# Patient Record
Sex: FEMALE | Race: WHITE | NOT HISPANIC OR LATINO | Employment: UNEMPLOYED | URBAN - METROPOLITAN AREA
[De-identification: names, ages, dates, MRNs, and addresses within clinical notes are randomized per-mention and may not be internally consistent; named-entity substitution may affect disease eponyms.]

---

## 2018-07-20 ENCOUNTER — OFFICE VISIT (OUTPATIENT)
Dept: GYNECOLOGY | Facility: CLINIC | Age: 43
End: 2018-07-20
Payer: COMMERCIAL

## 2018-07-20 VITALS
WEIGHT: 158 LBS | HEIGHT: 64 IN | DIASTOLIC BLOOD PRESSURE: 74 MMHG | SYSTOLIC BLOOD PRESSURE: 118 MMHG | BODY MASS INDEX: 26.98 KG/M2

## 2018-07-20 DIAGNOSIS — N76.2 ACUTE VULVITIS: ICD-10-CM

## 2018-07-20 DIAGNOSIS — R39.15 URINARY URGENCY: ICD-10-CM

## 2018-07-20 DIAGNOSIS — R35.0 URINARY FREQUENCY: ICD-10-CM

## 2018-07-20 DIAGNOSIS — Z12.31 SCREENING MAMMOGRAM, ENCOUNTER FOR: ICD-10-CM

## 2018-07-20 DIAGNOSIS — Z01.419 ENCOUNTER FOR GYNECOLOGICAL EXAMINATION WITHOUT ABNORMAL FINDING: Primary | ICD-10-CM

## 2018-07-20 DIAGNOSIS — N94.2 VAGINISMUS: ICD-10-CM

## 2018-07-20 DIAGNOSIS — Q51.4 UNICORNUATE UTERUS: ICD-10-CM

## 2018-07-20 DIAGNOSIS — N94.810 VULVAR VESTIBULITIS: ICD-10-CM

## 2018-07-20 DIAGNOSIS — N94.10 DYSPAREUNIA IN FEMALE: ICD-10-CM

## 2018-07-20 LAB
BACTERIA URNS QL MICRO: ABNORMAL /UL
BILIRUB UR QL STRIP.AUTO: NEGATIVE MG/DL
CLARITY UR REFRACT.AUTO: CLEAR
COLOR UR AUTO: YELLOW
GLUCOSE UR STRIP.AUTO-MCNC: NEGATIVE MG/DL
HGB UR QL STRIP.AUTO: NEGATIVE
HYALINE CASTS #/AREA URNS LPF: ABNORMAL /LPF
KETONES UR STRIP.AUTO-MCNC: NEGATIVE MG/DL
LEUKOCYTE ESTERASE UR QL STRIP.AUTO: NEGATIVE
NITRITE UR QL STRIP.AUTO: NEGATIVE
PH UR STRIP.AUTO: 7 [PH]
PROT UR QL STRIP.AUTO: NEGATIVE
RBC #/AREA URNS HPF: ABNORMAL /HPF
SP GR UR REFRACT.AUTO: 1.01
SQUAMOUS URNS QL MICRO: 1 /HPF
UROBILINOGEN UR STRIP-ACNC: 0.2 EU/DL
WBC #/AREA URNS HPF: ABNORMAL /HPF

## 2018-07-20 PROCEDURE — G0145 SCR C/V CYTO,THINLAYER,RESCR: HCPCS | Performed by: OBSTETRICS & GYNECOLOGY

## 2018-07-20 PROCEDURE — 81001 URINALYSIS AUTO W/SCOPE: CPT | Performed by: OBSTETRICS & GYNECOLOGY

## 2018-07-20 PROCEDURE — 99213 OFFICE O/P EST LOW 20 MIN: CPT | Mod: 25 | Performed by: OBSTETRICS & GYNECOLOGY

## 2018-07-20 PROCEDURE — 87624 HPV HI-RISK TYP POOLED RSLT: CPT | Performed by: OBSTETRICS & GYNECOLOGY

## 2018-07-20 PROCEDURE — S0610 ANNUAL GYNECOLOGICAL EXAMINA: HCPCS | Performed by: OBSTETRICS & GYNECOLOGY

## 2018-07-20 PROCEDURE — 87086 URINE CULTURE/COLONY COUNT: CPT | Performed by: OBSTETRICS & GYNECOLOGY

## 2018-07-20 RX ORDER — TRIAMCINOLONE ACETONIDE 0.25 MG/G
OINTMENT TOPICAL
Qty: 30 G | Refills: 1 | Status: SHIPPED | OUTPATIENT
Start: 2018-07-20 | End: 2019-08-29

## 2018-07-20 RX ORDER — ALBUTEROL SULFATE 90 UG/1
2 AEROSOL, METERED RESPIRATORY (INHALATION) AS NEEDED
Refills: 0 | COMMUNITY
Start: 2018-04-16 | End: 2022-05-28 | Stop reason: SDUPTHER

## 2018-07-20 NOTE — PROGRESS NOTES
"Chief Complaint:  Annual  Last exam . No complaints. Has unicornuate uterus, had infertility and pregnancy loss.  Kidneys evaluated, has 2. Has always had dyspareunia, at entry, \" sharp\", gets better after entry. \" Hitting something.\"Vag dryness, uses lubricant prn. No libido issues. One male partner.No hx of sexual abuse.  Due for mammo.     HPI:  2018  Aidee Damico is a 42 y.o. female who presents for annual exam.   Periods are regular every 26 days, lasting 8 days. Heavy for a few days then \"lingers\" Dysmenorrhea:none.     The patient is sexually active. GYN screening history: last pap: was normal and per pt. Domestic violence: no.     Current contraception: tubal ligation  History of abnormal Pap smear: no  Family history of uterine or ovarian cancer: no  History of abnormal mammogram: no  Family history of breast cancer: no    OB History: Menstrual History:  OB History      Para Term  AB Living    6 2 1 1 4 2    SAB TAB Ectopic Multiple Live Births    4 0 0 0 2         Menarche age: 11  Patient's last menstrual period was 07/15/2018.         Medical History:   Past Medical History:   Diagnosis Date   • Unicornate uterus        Surgical History:   Past Surgical History:   Procedure Laterality Date   •  SECTION     • FOOT FRACTURE SURGERY     • POSTPARTUM TUBAL LIGATION         Social History:   Social History     Social History   • Marital status:      Spouse name: N/A   • Number of children: N/A   • Years of education: N/A     Social History Main Topics   • Smoking status: Never Smoker   • Smokeless tobacco: Never Used   • Alcohol use Yes   • Drug use: No   • Sexual activity: Yes     Partners: Male     Birth control/ protection: Female Sterilization     Other Topics Concern   • None     Social History Narrative   • None       Family History:   Family History   Problem Relation Age of Onset   • Hypertension Father    • Osteoporosis Mother    • Parkinsonism Paternal " "Grandmother    • Parkinsonism Maternal Grandmother        Current Medications:   Current Outpatient Prescriptions   Medication Sig Dispense Refill   • PROAIR HFA 90 mcg/actuation inhaler Inhale 2 puffs as needed.  0   • triamcinolone (KENALOG) 0.025 % ointment Apply small amount  Two times a day 30 g 1     No current facility-administered medications for this visit.        Allergies: Ciprofloxacin and Gatifloxacin      Review of Systems  Constitutional: negative  Gastrointestinal: positive for: nothing; negative: abdominal pain and incont    Genitourinary:positive for frequency, urinary incontinence and urge incont since last delivery, no attempts at addressing  Reproductive:normal menses, no abnormal bleeding, pelvic pain or discharge  Integument/breast: negative for breast lump, breast tenderness and nipple discharge  Musculoskeletal:negative  Neurological: negative  Behavioral/Psych: negative for anxiety and depression  Endocrine: negative    Physical Exam  /74 (BP Location: Right upper arm, Patient Position: Sitting)   Ht 1.626 m (5' 4\")   Wt 71.7 kg (158 lb)   LMP 07/15/2018   BMI 27.12 kg/m²      General Appearance: Alert, cooperative, no acute distress  Head: Normocephalic, without obvious abnormality, atraumatic    Neck: no adenopathy  Thyroid: no enlargement/tenderness/nodules  Lungs: Clear to auscultation bilaterally, respirations unlabored  Heart: Regular rate and rhythm, S1 and S2 normal, no murmur, rub or gallop  Breast: no masses, nontender and no discharge  Abdomen: Soft, nontender, nondistended,   no masses, no organomegaly, + scar   Back: nl  Pelvic:    Vulva rt labia majora, erythematous with excoriations and minor swelling, no lesions  Urethra: Slightly tender  Bladder: non tender  Vagina normal mucosa, + q tip test at 11:00, no initial vaginismus but with palpation of rt iliococcygeal had tightening of perineal muscles, rt iliococcygeal non tender but left with some tenderness, rt " obturator tender, left nontender  Cervix : mobile, nontender  Uterus normal size, anteverted, unicornutae, not adherent to ant abd wall, nontender  Adnexa no mass, fullness, tenderness  Rectal:no mass   Extremities:nl  Musculoskeletal:nl    Skin: Skin color, texture, turgor normal, no rashes or lesions  Lymph nodes: supraclavicular and axillary nodes normal  Neurologic:oriented and  memory intact  Psych:normal affect and behavior appropriate      Assessment & Plan    Pap with hpv done    Mammo ordered    Dyspareunia and vaginismus, longstanding. Suspect pelvic floor dysfunction +/- vestibulitis. Refer to Pt. Can try reverse kegel to aid with penetration in the meantime    Vulvitis, cause unclear. Rx with 2 weeks of triamcinolone. Rtn for f/u    Urge incont. Drinks large volumes of water and coffee. Discussed diet and given bladder diet lit. Pelvic floor PT can also address this issue. UA and C&S also done for completeness        Return in about 6 weeks (around 8/31/2018) for vulvitis check.  Nadja Caceres MD

## 2018-07-21 LAB — BACTERIA UR CULT: NORMAL

## 2018-07-25 LAB
HPV HR 12 DNA CVX QL NAA+PROBE: NEGATIVE
HPV16 DNA SPEC QL NAA+PROBE: NEGATIVE
HPV18 DNA SPEC QL NAA+PROBE: NEGATIVE

## 2018-07-26 LAB
CASE RPRT: NORMAL
CLINICAL INFO: NORMAL
CLINICAL INFO: NORMAL
LMP START DATE: NORMAL
SPECIMEN PROCESSING COMMENT: NORMAL
THIN PREP CVX: NORMAL

## 2019-08-29 ENCOUNTER — APPOINTMENT (EMERGENCY)
Dept: RADIOLOGY | Facility: HOSPITAL | Age: 44
End: 2019-08-29
Attending: EMERGENCY MEDICINE
Payer: COMMERCIAL

## 2019-08-29 ENCOUNTER — HOSPITAL ENCOUNTER (EMERGENCY)
Facility: HOSPITAL | Age: 44
Discharge: HOME | End: 2019-08-29
Attending: EMERGENCY MEDICINE
Payer: COMMERCIAL

## 2019-08-29 VITALS
HEART RATE: 60 BPM | SYSTOLIC BLOOD PRESSURE: 125 MMHG | HEIGHT: 64 IN | DIASTOLIC BLOOD PRESSURE: 67 MMHG | TEMPERATURE: 97.8 F | OXYGEN SATURATION: 100 % | BODY MASS INDEX: 26.8 KG/M2 | RESPIRATION RATE: 17 BRPM | WEIGHT: 157 LBS

## 2019-08-29 DIAGNOSIS — Z04.1 ENCOUNTER FOR EXAMINATION FOLLOWING MOTOR VEHICLE ACCIDENT (MVA): Primary | ICD-10-CM

## 2019-08-29 DIAGNOSIS — S46.911A STRAIN OF RIGHT SHOULDER, INITIAL ENCOUNTER: ICD-10-CM

## 2019-08-29 DIAGNOSIS — S16.1XXA CERVICAL STRAIN, ACUTE, INITIAL ENCOUNTER: ICD-10-CM

## 2019-08-29 PROCEDURE — 73502 X-RAY EXAM HIP UNI 2-3 VIEWS: CPT | Mod: LT

## 2019-08-29 PROCEDURE — 99283 EMERGENCY DEPT VISIT LOW MDM: CPT | Mod: 25

## 2019-08-29 PROCEDURE — 73030 X-RAY EXAM OF SHOULDER: CPT | Mod: 50

## 2019-08-29 PROCEDURE — 71046 X-RAY EXAM CHEST 2 VIEWS: CPT

## 2019-08-29 PROCEDURE — 63700000 HC SELF-ADMINISTRABLE DRUG: Performed by: PHYSICIAN ASSISTANT

## 2019-08-29 RX ORDER — IBUPROFEN 600 MG/1
600 TABLET ORAL ONCE
Status: COMPLETED | OUTPATIENT
Start: 2019-08-29 | End: 2019-08-29

## 2019-08-29 RX ORDER — IBUPROFEN 600 MG/1
600 TABLET ORAL EVERY 6 HOURS PRN
Qty: 16 TABLET | Refills: 0 | Status: SHIPPED | OUTPATIENT
Start: 2019-08-29 | End: 2020-12-23 | Stop reason: ENTERED-IN-ERROR

## 2019-08-29 RX ADMIN — IBUPROFEN 600 MG: 600 TABLET ORAL at 16:52

## 2019-08-29 ASSESSMENT — ENCOUNTER SYMPTOMS
ABDOMINAL DISTENTION: 0
LIGHT-HEADEDNESS: 0
WEAKNESS: 0
SHORTNESS OF BREATH: 0
WOUND: 0
ARTHRALGIAS: 1
DIZZINESS: 0
HEADACHES: 1
ACTIVITY CHANGE: 0
ALTERED MENTAL STATUS: 0
LOSS OF CONSCIOUSNESS: 0
CHEST TIGHTNESS: 0
CHILLS: 0
FLANK PAIN: 0
COUGH: 0
NECK STIFFNESS: 0
PHOTOPHOBIA: 0
VOMITING: 0
NAUSEA: 0
DIAPHORESIS: 0
FATIGUE: 0
CONTUSION: 0
BACK PAIN: 0
FACIAL SWELLING: 0
NUMBNESS: 0
NECK PAIN: 1
SPEECH DIFFICULTY: 0
ABDOMINAL PAIN: 0
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
## 2019-09-05 ENCOUNTER — TRANSCRIBE ORDERS (OUTPATIENT)
Dept: SCHEDULING | Age: 44
End: 2019-09-05

## 2019-09-05 DIAGNOSIS — M25.511 PAIN IN RIGHT SHOULDER: Primary | ICD-10-CM

## 2019-09-06 ENCOUNTER — HOSPITAL ENCOUNTER (OUTPATIENT)
Dept: RADIOLOGY | Age: 44
Discharge: HOME | End: 2019-09-06
Attending: FAMILY MEDICINE
Payer: COMMERCIAL

## 2019-09-06 DIAGNOSIS — M25.511 PAIN IN RIGHT SHOULDER: ICD-10-CM

## 2020-12-23 ENCOUNTER — OFFICE VISIT (OUTPATIENT)
Dept: PRIMARY CARE | Facility: CLINIC | Age: 45
End: 2020-12-23
Payer: COMMERCIAL

## 2020-12-23 VITALS
TEMPERATURE: 98.7 F | HEART RATE: 67 BPM | SYSTOLIC BLOOD PRESSURE: 120 MMHG | BODY MASS INDEX: 29.52 KG/M2 | DIASTOLIC BLOOD PRESSURE: 80 MMHG | RESPIRATION RATE: 14 BRPM | HEIGHT: 63 IN | WEIGHT: 166.6 LBS | OXYGEN SATURATION: 97 %

## 2020-12-23 DIAGNOSIS — Z00.00 ROUTINE PHYSICAL EXAMINATION: Primary | ICD-10-CM

## 2020-12-23 DIAGNOSIS — Z20.822 EXPOSURE TO COVID-19 VIRUS: ICD-10-CM

## 2020-12-23 DIAGNOSIS — Z12.31 ENCOUNTER FOR SCREENING MAMMOGRAM FOR MALIGNANT NEOPLASM OF BREAST: ICD-10-CM

## 2020-12-23 PROBLEM — N76.2 ACUTE VULVITIS: Status: RESOLVED | Noted: 2018-07-20 | Resolved: 2020-12-23

## 2020-12-23 PROBLEM — R35.0 URINARY FREQUENCY: Status: RESOLVED | Noted: 2018-07-20 | Resolved: 2020-12-23

## 2020-12-23 PROBLEM — N94.10 DYSPAREUNIA IN FEMALE: Status: RESOLVED | Noted: 2018-07-20 | Resolved: 2020-12-23

## 2020-12-23 PROCEDURE — 99386 PREV VISIT NEW AGE 40-64: CPT | Performed by: FAMILY MEDICINE

## 2020-12-23 RX ORDER — DOXYLAMINE SUCCINATE 25 MG/1
25 TABLET ORAL NIGHTLY PRN
COMMUNITY
End: 2021-04-06 | Stop reason: ENTERED-IN-ERROR

## 2020-12-23 SDOH — HEALTH STABILITY: MENTAL HEALTH: HOW OFTEN DO YOU HAVE A DRINK CONTAINING ALCOHOL?: 2-4 TIMES A MONTH

## 2020-12-23 SDOH — HEALTH STABILITY: MENTAL HEALTH: HOW OFTEN DO YOU HAVE SIX OR MORE DRINKS ON ONE OCCASION?: LESS THAN MONTHLY

## 2020-12-23 SDOH — HEALTH STABILITY: MENTAL HEALTH: HOW MANY DRINKS CONTAINING ALCOHOL DO YOU HAVE ON A TYPICAL DAY WHEN YOU ARE DRINKING?: 1 OR 2

## 2020-12-23 ASSESSMENT — ENCOUNTER SYMPTOMS
WHEEZING: 0
MYALGIAS: 0
RHINORRHEA: 0
CHILLS: 0
PALPITATIONS: 0
CONSTIPATION: 0
SLEEP DISTURBANCE: 0
HEADACHES: 0
HEMATURIA: 0
COUGH: 0
DIARRHEA: 0
SORE THROAT: 0
FATIGUE: 0
BRUISES/BLEEDS EASILY: 0
DIZZINESS: 0
SINUS PAIN: 0
FEVER: 0
ABDOMINAL PAIN: 0
SINUS PRESSURE: 0
ARTHRALGIAS: 0
FREQUENCY: 0
CHEST TIGHTNESS: 0
EYE PAIN: 0
SHORTNESS OF BREATH: 0
NERVOUS/ANXIOUS: 0
WEAKNESS: 0

## 2020-12-23 ASSESSMENT — PATIENT HEALTH QUESTIONNAIRE - PHQ9: SUM OF ALL RESPONSES TO PHQ9 QUESTIONS 1 & 2: 0

## 2020-12-23 NOTE — PROGRESS NOTES
Subjective      Patient ID: Aidee Damico is a 45 y.o. female.    She is here for new patient physical. Pt has been doing well overall. She is trying to follow well balanced meals but hasn't been no consistent routine b/c she is home with the children. Stays well hydrated. She is not exercising like she should be but working on getting back on track. Average 7hrs of sleep a night but doesn't sleep well and does need unisom to sleep. She is also getting hot flashes at night as well and not sleeping well. Normal BMs and urination. Normal menstrual cycles. UTD with eye and dental exam. Due for pap and mammo. Due for labs. UTD with immunizations.       The following have been reviewed and updated as appropriate in this visit:  Tobacco  Allergies  Meds  Problems  Med Hx  Surg Hx  Fam Hx       Review of Systems   Constitutional: Negative for chills, fatigue and fever.   HENT: Negative for ear pain, postnasal drip, rhinorrhea, sinus pressure, sinus pain and sore throat.    Eyes: Negative for pain and visual disturbance.   Respiratory: Negative for cough, chest tightness, shortness of breath and wheezing.    Cardiovascular: Negative for chest pain and palpitations.   Gastrointestinal: Negative for abdominal pain, constipation and diarrhea.   Genitourinary: Negative for decreased urine volume, frequency and hematuria.   Musculoskeletal: Negative for arthralgias and myalgias.   Skin: Negative for rash.   Neurological: Negative for dizziness, weakness and headaches.   Hematological: Does not bruise/bleed easily.   Psychiatric/Behavioral: Negative for behavioral problems, sleep disturbance and suicidal ideas. The patient is not nervous/anxious.        Current Outpatient Medications   Medication Sig Dispense Refill   • doxylamine (UNISOM, DOXYLAMINE,) 25 mg tablet Take 25 mg by mouth nightly as needed for sleep.     • PROAIR HFA 90 mcg/actuation inhaler Inhale 2 puffs as needed.  0     No current facility-administered  medications for this visit.      Past Medical History:   Diagnosis Date   • Asthma    • Unicornate uterus      Family History   Problem Relation Age of Onset   • Hypertension Biological Father    • Osteoporosis Biological Mother    • Thyroid disease Biological Mother    • Parkinsonism Paternal Grandmother    • Parkinsonism Maternal Grandmother    • Depression Biological Sister    • Thyroid disease Biological Sister    • Hypertension Biological Sister    • No Known Problems Paternal Grandfather    • Osteoporosis Mother's Sister      Past Surgical History:   Procedure Laterality Date   •  SECTION      x 2   • D&C FIRST TRIMESTER / TX INCOMPLETE / MISSED / SEPTIC / INDUCED      • FOOT FRACTURE SURGERY     • POSTPARTUM TUBAL LIGATION       Social History     Socioeconomic History   • Marital status:      Spouse name: Not on file   • Number of children: 2   • Years of education: Not on file   • Highest education level: Not on file   Occupational History   • Occupation: Retired   Social Needs   • Financial resource strain: Not on file   • Food insecurity     Worry: Not on file     Inability: Not on file   • Transportation needs     Medical: Not on file     Non-medical: Not on file   Tobacco Use   • Smoking status: Never Smoker   • Smokeless tobacco: Never Used   Substance and Sexual Activity   • Alcohol use: Yes     Frequency: 2-4 times a month     Drinks per session: 1 or 2     Binge frequency: Less than monthly   • Drug use: No   • Sexual activity: Yes     Partners: Male     Birth control/protection: Female Sterilization/Tubes Tied   Lifestyle   • Physical activity     Days per week: Not on file     Minutes per session: Not on file   • Stress: Not on file   Relationships   • Social connections     Talks on phone: Not on file     Gets together: Not on file     Attends [employer] service: Not on file     Active member of club or organization: Not on file     Attends meetings of clubs or  "organizations: Not on file     Relationship status: Not on file   • Intimate partner violence     Fear of current or ex partner: Not on file     Emotionally abused: Not on file     Physically abused: Not on file     Forced sexual activity: Not on file   Other Topics Concern   • Not on file   Social History Narrative   • Not on file     Allergies   Allergen Reactions   • Ciprofloxacin Hives   • Gatifloxacin        Objective   Visit Vitals  /80 (BP Location: Left upper arm, Patient Position: Sitting)   Pulse 67   Temp 37.1 °C (98.7 °F) (Oral)   Resp 14   Ht 1.6 m (5' 3\")   Wt 75.6 kg (166 lb 9.6 oz)   LMP 12/02/2020 (Within Days)   SpO2 97%   BMI 29.51 kg/m²     Physical Exam  Vitals signs and nursing note reviewed.   Constitutional:       Appearance: She is well-developed.   HENT:      Head: Normocephalic and atraumatic.      Right Ear: Tympanic membrane and external ear normal.      Left Ear: Tympanic membrane and external ear normal.      Nose: Nose normal.      Mouth/Throat:      Mouth: Mucous membranes are moist.      Pharynx: Oropharynx is clear. No posterior oropharyngeal erythema.   Eyes:      Conjunctiva/sclera: Conjunctivae normal.      Pupils: Pupils are equal, round, and reactive to light.   Neck:      Musculoskeletal: Normal range of motion and neck supple.      Thyroid: No thyromegaly.   Cardiovascular:      Rate and Rhythm: Normal rate and regular rhythm.      Heart sounds: Normal heart sounds. No murmur.   Pulmonary:      Effort: Pulmonary effort is normal.      Breath sounds: Normal breath sounds. No wheezing.   Abdominal:      General: Bowel sounds are normal.      Palpations: Abdomen is soft.      Tenderness: There is no abdominal tenderness.   Musculoskeletal: Normal range of motion.         General: No tenderness.   Lymphadenopathy:      Cervical: No cervical adenopathy.   Skin:     General: Skin is warm and dry.      Findings: No rash.   Neurological:      General: No focal deficit present. "      Mental Status: She is alert and oriented to person, place, and time.      Cranial Nerves: No cranial nerve deficit.   Psychiatric:         Mood and Affect: Mood normal.         Behavior: Behavior normal.         Assessment/Plan   Problem List Items Addressed This Visit     None      Visit Diagnoses     Routine physical examination    -  Primary    Relevant Orders    Comprehensive metabolic panel    TSH    CBC    Lipid panel    Encounter for screening mammogram for malignant neoplasm of breast        Relevant Orders    BI SCREENING MAMMOGRAM BILATERAL(TOMOSYNTHESIS)    Exposure to COVID-19 virus        Relevant Orders    Covid-19 SARS CoV-2 Antibody (IgG)      She is doing well overall. Discussed diet and exercise. Will check updated labs. Will get mammo and she will set up for annual gyn soon. UTD with eye and dental exam. UTD with immunizations.     Naz David, DO  12/23/2020

## 2021-02-09 ENCOUNTER — TELEPHONE (OUTPATIENT)
Dept: PRIMARY CARE | Facility: CLINIC | Age: 46
End: 2021-02-09

## 2021-02-09 NOTE — TELEPHONE ENCOUNTER
Pt sister  suddenly,  just this morning, pt is struggling and would like to speak to  Directly.if possible.    coronap

## 2021-02-10 ENCOUNTER — TELEMEDICINE (OUTPATIENT)
Dept: PRIMARY CARE | Facility: CLINIC | Age: 46
End: 2021-02-10
Payer: COMMERCIAL

## 2021-02-10 ENCOUNTER — TELEPHONE (OUTPATIENT)
Dept: PRIMARY CARE | Facility: CLINIC | Age: 46
End: 2021-02-10

## 2021-02-10 DIAGNOSIS — F41.9 ANXIETY: Primary | ICD-10-CM

## 2021-02-10 PROCEDURE — 99213 OFFICE O/P EST LOW 20 MIN: CPT | Mod: 95 | Performed by: FAMILY MEDICINE

## 2021-02-10 RX ORDER — LORAZEPAM 0.5 MG/1
0.5 TABLET ORAL NIGHTLY
Qty: 15 TABLET | Refills: 0 | Status: SHIPPED | OUTPATIENT
Start: 2021-02-10 | End: 2021-03-03 | Stop reason: SDUPTHER

## 2021-02-10 ASSESSMENT — ENCOUNTER SYMPTOMS
PALPITATIONS: 0
DIARRHEA: 0
SORE THROAT: 0
BRUISES/BLEEDS EASILY: 0
HEADACHES: 0
CHILLS: 0
SLEEP DISTURBANCE: 1
NERVOUS/ANXIOUS: 1
FREQUENCY: 0
HEMATURIA: 0
WHEEZING: 0
COUGH: 0
RHINORRHEA: 0
ARTHRALGIAS: 0
FEVER: 0
FATIGUE: 0
ABDOMINAL PAIN: 0
WEAKNESS: 0
SINUS PRESSURE: 0
EYE PAIN: 0
CONSTIPATION: 0
DIZZINESS: 0
SHORTNESS OF BREATH: 0
CHEST TIGHTNESS: 0
MYALGIAS: 0
SINUS PAIN: 0

## 2021-02-10 NOTE — TELEPHONE ENCOUNTER
Did telemed today and she needs to be set up with Syl[facility] due to situational anxiety regarding her recent sister's death

## 2021-02-10 NOTE — PROGRESS NOTES
Verification of Patient Location:  The patient affirms they are currently located in the following state:[place]     Request for Consent:  You and I are about to have a telemedicine check-in or visit. This is allowed because you are already my patient, and you have requested it.  This telemedicine visit will be billed to your health insurance or you, if you are self-insured.  You understand you will be responsible for any copayments or coinsurances that apply to your telemedicine visit.  Before starting our telemedicine visit, I am required to get your consent for this virtual check-in or visit by telemedicine. Do you consent?      Patient Response to Request for Consent: Yes    The following have been reviewed and updated as appropriate in this visit:  Tobacco  Allergies  Meds  Problems  Med Hx  Surg Hx  Fam Hx  Soc Hx          Visit Documentation:    Subjective      Patient ID: Aidee Damico is a 45 y.o. female.    Called and spoke to patient over the phone instead of OV due to COVID. Her sister had long term depression and has attempted to kill herself multiple times but was able to stop her but yesterday she was called and was told her sister hung herself outside her house and she has a  and 2 children. She is very distraught and at her sister's trying to get things ready for her  next wk. She having to be strong for her mother and her sister's family. She is not sleeping well and having crying spells and panic attacks.       The following have been reviewed and updated as appropriate in this visit:  Tobacco  Allergies  Meds  Problems  Med Hx  Surg Hx  Fam Hx       Review of Systems   Constitutional: Negative for chills, fatigue and fever.   HENT: Negative for ear pain, postnasal drip, rhinorrhea, sinus pressure, sinus pain and sore throat.    Eyes: Negative for pain and visual disturbance.   Respiratory: Negative for cough, chest tightness, shortness of breath and wheezing.     Cardiovascular: Negative for chest pain and palpitations.   Gastrointestinal: Negative for abdominal pain, constipation and diarrhea.   Genitourinary: Negative for decreased urine volume, frequency and hematuria.   Musculoskeletal: Negative for arthralgias and myalgias.   Skin: Negative for rash.   Neurological: Negative for dizziness, weakness and headaches.   Hematological: Does not bruise/bleed easily.   Psychiatric/Behavioral: Positive for sleep disturbance. Negative for behavioral problems and suicidal ideas. The patient is nervous/anxious.        Current Outpatient Medications   Medication Sig Dispense Refill   • doxylamine (UNISOM, DOXYLAMINE,) 25 mg tablet Take 25 mg by mouth nightly as needed for sleep.     • PROAIR HFA 90 mcg/actuation inhaler Inhale 2 puffs as needed.  0   • LORazepam (ATIVAN) 0.5 mg tablet Take 1 tablet (0.5 mg total) by mouth nightly. 15 tablet 0     No current facility-administered medications for this visit.      Past Medical History:   Diagnosis Date   • Asthma    • Unicornate uterus      Family History   Problem Relation Age of Onset   • Hypertension Biological Father    • Osteoporosis Biological Mother    • Thyroid disease Biological Mother    • Parkinsonism Paternal Grandmother    • Parkinsonism Maternal Grandmother    • Depression Biological Sister    • Thyroid disease Biological Sister    • Hypertension Biological Sister    • No Known Problems Paternal Grandfather    • Osteoporosis Mother's Sister      Past Surgical History:   Procedure Laterality Date   •  SECTION      x 2   • D&C FIRST TRIMESTER / TX INCOMPLETE / MISSED / SEPTIC / INDUCED      • FOOT FRACTURE SURGERY     • POSTPARTUM TUBAL LIGATION       Social History     Socioeconomic History   • Marital status:      Spouse name: Not on file   • Number of children: 2   • Years of education: Not on file   • Highest education level: Not on file   Occupational History   • Occupation: Retired   Social  Needs   • Financial resource strain: Not on file   • Food insecurity     Worry: Not on file     Inability: Not on file   • Transportation needs     Medical: Not on file     Non-medical: Not on file   Tobacco Use   • Smoking status: Never Smoker   • Smokeless tobacco: Never Used   Substance and Sexual Activity   • Alcohol use: Yes     Frequency: 2-4 times a month     Drinks per session: 1 or 2     Binge frequency: Less than monthly   • Drug use: No   • Sexual activity: Yes     Partners: Male     Birth control/protection: Female Sterilization/Tubes Tied   Lifestyle   • Physical activity     Days per week: Not on file     Minutes per session: Not on file   • Stress: Not on file   Relationships   • Social connections     Talks on phone: Not on file     Gets together: Not on file     Attends Worship service: Not on file     Active member of club or organization: Not on file     Attends meetings of clubs or organizations: Not on file     Relationship status: Not on file   • Intimate partner violence     Fear of current or ex partner: Not on file     Emotionally abused: Not on file     Physically abused: Not on file     Forced sexual activity: Not on file   Other Topics Concern   • Not on file   Social History Narrative   • Not on file     Allergies   Allergen Reactions   • Ciprofloxacin Hives   • Gatifloxacin            Assessment/Plan   Problem List Items Addressed This Visit     None      Visit Diagnoses     Anxiety    -  Primary    Relevant Medications    LORazepam (ATIVAN) 0.5 mg tablet      She is dealing with a lot of overwhelming feelings and everything is so fresh and given the situation she is going to take ativan at night and if needed during the day and she will get set up with Sylvie the therapist in our office. She is aware how to take medication and side effects and will keep me updated on how she feels over next wk or so.     Naz David DO  2/10/2021        Time Spent:  I spent 11 minutes on this  date of service performing the following activities: obtaining history, entering orders and providing counseling and education.

## 2021-02-11 NOTE — TELEPHONE ENCOUNTER
Please give patient another call today to get scheduled as NPV (60 min) with Sylvie.  If she does not schedule, route back to me.

## 2021-02-17 NOTE — TELEPHONE ENCOUNTER
FYI, This patient has been contacted twice by our office to schedule with Sylvie.  She has not scheduled.

## 2021-02-18 NOTE — TELEPHONE ENCOUNTER
Can you call to check in with patient to see how she is doing and if she is planning to set up appt with our therapist Sylvie>>

## 2021-02-19 ENCOUNTER — TELEMEDICINE (OUTPATIENT)
Dept: PRIMARY CARE | Facility: CLINIC | Age: 46
End: 2021-02-19
Payer: COMMERCIAL

## 2021-02-19 DIAGNOSIS — J01.40 ACUTE NON-RECURRENT PANSINUSITIS: Primary | ICD-10-CM

## 2021-02-19 PROCEDURE — 99213 OFFICE O/P EST LOW 20 MIN: CPT | Mod: 95 | Performed by: FAMILY MEDICINE

## 2021-02-19 RX ORDER — AMOXICILLIN AND CLAVULANATE POTASSIUM 875; 125 MG/1; MG/1
1 TABLET, FILM COATED ORAL 2 TIMES DAILY
Qty: 20 TABLET | Refills: 0 | Status: SHIPPED | OUTPATIENT
Start: 2021-02-19 | End: 2021-03-01

## 2021-02-19 ASSESSMENT — ENCOUNTER SYMPTOMS
ABDOMINAL PAIN: 0
MYALGIAS: 0
FATIGUE: 1
SLEEP DISTURBANCE: 1
WEAKNESS: 0
SHORTNESS OF BREATH: 0
BRUISES/BLEEDS EASILY: 0
SINUS PRESSURE: 1
HEMATURIA: 0
SINUS PAIN: 1
CHEST TIGHTNESS: 0
PALPITATIONS: 0
RHINORRHEA: 1
DIARRHEA: 0
CONSTIPATION: 0
ARTHRALGIAS: 0
CHILLS: 0
COUGH: 0
WHEEZING: 0
SORE THROAT: 0
DIZZINESS: 0
FREQUENCY: 0
FEVER: 0
NERVOUS/ANXIOUS: 0
HEADACHES: 0
EYE PAIN: 0

## 2021-02-19 NOTE — PROGRESS NOTES
Verification of Patient Location:  The patient affirms they are currently located in the following state: [place]    Request for Consent:    Video Encounter   Walter, my name is Naz JESUS David DO.  Before we proceed, can you please verify your identification by telling me your full name and date of birth?  Can you tell me who is in the room with you?    You and I are about to have a telemedicine check-in or visit because you have requested it.  This is a live video-conference.  I am a real person, speaking to you in real time.  There is no one else with me on the video-conference.  However, when we use ([employer], [employer], etc) it is important for you to know that the video-conference may not be secure or private.  I am not recording this conversation and I am asking you not to record it.  This telemedicine visit will be billed to your health insurance or you, if you are self-insured.  You understand you will be responsible for any copayments or coinsurances that apply to your telemedicine visit.  Communication platform used for this encounter:  Integrated Zoom via [employer] Video Visit     Before starting our telemedicine visit, I am required to get your consent for this virtual check-in or visit by telemedicine. Do you consent?      Patient Response to Request for Consent:  Yes      Visit Documentation:  Subjective     Patient ID: Aidee Damico is a 45 y.o. female.  1975      Called and spoke to patient over zoom instead of OV due to COVID. Her son started with sinus symptoms last wk and hasn't been getting better so he is now taking zpak and amox but she started with similar symptoms 3-4 days ago with yellow rhinorrhea, postnasal drip, sinus pain and congestion, fatigue. She has been using allegra D. She usually gets a sinus infection every yr in Feb. No fevers. No sore throat. No cough or wheezing.       The following have been reviewed and updated as appropriate in this visit:  Tobacco  Allergies  Meds   Problems  Med Hx  Surg Hx  Fam Hx  Soc Hx        Review of Systems   Constitutional: Positive for fatigue. Negative for chills and fever.   HENT: Positive for congestion, ear pain, postnasal drip, rhinorrhea, sinus pressure and sinus pain. Negative for sore throat.    Eyes: Negative for pain and visual disturbance.   Respiratory: Negative for cough, chest tightness, shortness of breath and wheezing.    Cardiovascular: Negative for chest pain and palpitations.   Gastrointestinal: Negative for abdominal pain, constipation and diarrhea.   Genitourinary: Negative for decreased urine volume, frequency and hematuria.   Musculoskeletal: Negative for arthralgias and myalgias.   Skin: Negative for rash.   Neurological: Negative for dizziness, weakness and headaches.   Hematological: Does not bruise/bleed easily.   Psychiatric/Behavioral: Positive for sleep disturbance. Negative for behavioral problems and suicidal ideas. The patient is not nervous/anxious.          Assessment/Plan   Diagnoses and all orders for this visit:    Acute non-recurrent pansinusitis (Primary)  -     amoxicillin-pot clavulanate (AUGMENTIN) 875-125 mg per tablet; Take 1 tablet by mouth 2 (two) times a day for 10 days.    Given symptoms have been going on despite OTC medications and now leading into a weekend will call in augmentin along with fluids, rest and supportive care. She will call me if symptoms persist.     Time Spent:  I spent 7 minutes on this date of service performing the following activities: obtaining history, documenting and providing counseling and education.

## 2021-03-03 DIAGNOSIS — F41.9 ANXIETY: ICD-10-CM

## 2021-03-03 RX ORDER — LORAZEPAM 0.5 MG/1
0.5 TABLET ORAL NIGHTLY
Qty: 15 TABLET | Refills: 0 | Status: SHIPPED | OUTPATIENT
Start: 2021-03-03 | End: 2021-04-06 | Stop reason: ENTERED-IN-ERROR

## 2021-03-03 NOTE — TELEPHONE ENCOUNTER
Do you have enough medication for the next 5 days?:N    Did you request this medication through your pharmacy or our patient portal in the last day or two?*N    Name of medication requested: LORazepam (ATIVAN) 0.5 mg tablet   Medication Strength:   Mediation Directions:   Quantity Requested (example 30/90):   Number of refills requested:       System Generated Preferred Pharmacy(s)  are as follows.  If more than one pharmacy displays please identify which one should be used for this call    Has the pharmacy information below been confirmed with the patient?        **please send to [facility] [address] [facility]**                 [id number] [place] - [address] [facility] [address]  [address] [place] [address]  Phone: [phone] Fax: [phone]    [id number] [place] - [address] [place] [address]  Phone: [phone] Fax: [phone]    [id number]  [place] - [address]  [place] [address]  Phone: [phone] Fax: [phone]          If necessary, provide pharmacy details below.      Is this pharmacy a mail order pharmacy?:   Pharmacy Name:   Pharmacy City:   Pharmacy Telephone:     Additional Comments:    Next Encounter with this provider: Visit date not found

## 2021-03-18 ENCOUNTER — TELEPHONE (OUTPATIENT)
Dept: PRIMARY CARE | Facility: CLINIC | Age: 46
End: 2021-03-18

## 2021-03-18 ENCOUNTER — TELEMEDICINE (OUTPATIENT)
Dept: PRIMARY CARE | Facility: CLINIC | Age: 46
End: 2021-03-18
Payer: COMMERCIAL

## 2021-03-18 DIAGNOSIS — F43.23 ADJUSTMENT REACTION WITH ANXIETY AND DEPRESSION: Primary | ICD-10-CM

## 2021-03-18 PROCEDURE — 99213 OFFICE O/P EST LOW 20 MIN: CPT | Mod: 95 | Performed by: NURSE PRACTITIONER

## 2021-03-18 RX ORDER — SERTRALINE HYDROCHLORIDE 50 MG/1
50 TABLET, FILM COATED ORAL DAILY
Qty: 90 TABLET | Refills: 3 | Status: SHIPPED | OUTPATIENT
Start: 2021-03-18 | End: 2022-01-27 | Stop reason: SDUPTHER

## 2021-03-18 RX ORDER — SERTRALINE HYDROCHLORIDE 50 MG/1
50 TABLET, FILM COATED ORAL DAILY
Qty: 90 TABLET | Refills: 3 | Status: SHIPPED | OUTPATIENT
Start: 2021-03-18 | End: 2021-03-18

## 2021-03-18 RX ORDER — LORAZEPAM 0.5 MG/1
0.5 TABLET ORAL EVERY 6 HOURS PRN
Qty: 30 TABLET | Refills: 0 | Status: SHIPPED | OUTPATIENT
Start: 2021-03-18 | End: 2021-04-06 | Stop reason: ENTERED-IN-ERROR

## 2021-03-18 ASSESSMENT — ENCOUNTER SYMPTOMS
NERVOUS/ANXIOUS: 1
DIAPHORESIS: 0
COUGH: 0
CHEST TIGHTNESS: 0
FEVER: 0
ABDOMINAL PAIN: 0
SLEEP DISTURBANCE: 1
WOUND: 0
CONFUSION: 0
AGITATION: 0
SORE THROAT: 0
SINUS PRESSURE: 0
HEMATOLOGIC/LYMPHATIC NEGATIVE: 1
NAUSEA: 0
NEUROLOGICAL NEGATIVE: 1
MYALGIAS: 0
ENDOCRINE NEGATIVE: 1
SHORTNESS OF BREATH: 0
ALLERGIC/IMMUNOLOGIC NEGATIVE: 1
CHILLS: 0
EYES NEGATIVE: 1
PALPITATIONS: 0
DIARRHEA: 0
FATIGUE: 0
SINUS PAIN: 0

## 2021-03-18 NOTE — PATIENT INSTRUCTIONS
Please begin taking Zoloft 50mg after you eat breakfast each morning.  Continue to take 0.5mg Ativan before bed.  Begin taking 5,000 IU of Vit D3 to help improve your energy and mood.  Please follow-up with any questions.    Thank you for choosing [facility].  CAESAR Mehta

## 2021-03-18 NOTE — PROGRESS NOTES
Verification of Patient Location:  The patient affirms they are currently located in the following state: [place]    Request for Consent:    Video Encounter   Walter, my name is CAESAR Mehta.  Before we proceed, can you please verify your identification by telling me your full name and date of birth?  Can you tell me who is in the room with you?    You and I are about to have a telemedicine check-in or visit because you have requested it.  This is a live video-conference.  I am a real person, speaking to you in real time.  There is no one else with me on the video-conference.  However, when we use ([employer], [employer], etc) it is important for you to know that the video-conference may not be secure or private.  I am not recording this conversation and I am asking you not to record it.  This telemedicine visit will be billed to your health insurance or you, if you are self-insured.  You understand you will be responsible for any copayments or coinsurances that apply to your telemedicine visit.  Communication platform used for this encounter:  [employer]     Before starting our telemedicine visit, I am required to get your consent for this virtual check-in or visit by telemedicine. Do you consent?      Patient Response to Request for Consent:  Yes      Visit Documentation:  Subjective     Patient ID: Aidee Damico is a 45 y.o. female.  1975      Pt c/o worsening anxiety and depression due to her sister's death by suicide that occurred on 2/9.  Pt has been having trouble falling asleep at night and was prescribed 0.5mg Ativan at bedtime and has been taking Unasom as well.  She saw her grief counselor today who suggested she may need another medication due to her worsening symptoms.  Pt is willing to begin Zoloft.        The following have been reviewed and updated as appropriate in this visit:       Review of Systems   Constitutional: Negative for chills, diaphoresis, fatigue and fever.   HENT: Negative for  congestion, ear pain, sinus pressure, sinus pain, sneezing and sore throat.    Eyes: Negative.    Respiratory: Negative for cough, chest tightness and shortness of breath.    Cardiovascular: Negative for chest pain and palpitations.   Gastrointestinal: Negative for abdominal pain, diarrhea and nausea.   Endocrine: Negative.    Genitourinary: Negative.    Musculoskeletal: Negative for myalgias.   Skin: Negative for rash and wound.   Allergic/Immunologic: Negative.    Neurological: Negative.    Hematological: Negative.    Psychiatric/Behavioral: Positive for sleep disturbance. Negative for agitation, behavioral problems, confusion, self-injury and suicidal ideas. The patient is nervous/anxious.         Anxiety & depression over loss of sister         Assessment/Plan   Diagnoses and all orders for this visit:    Adjustment reaction with anxiety and depression (Primary)  -     LORazepam (ATIVAN) 0.5 mg tablet; Take 1 tablet (0.5 mg total) by mouth every 6 (six) hours as needed for anxiety.  -     sertraline (ZOLOFT) 50 mg tablet; Take 1 tablet (50 mg total) by mouth daily.        Time Spent:  I spent 30 minutes on this date of service performing the following activities: obtaining history, entering orders, documenting, preparing for visit, obtaining / reviewing records, providing counseling and education, independently reviewing study/studies, communicating results and coordinating care.

## 2021-03-18 NOTE — TELEPHONE ENCOUNTER
----- Message from CAESAR Mehta sent at 3/18/2021  5:50 PM EDT -----  Regarding: Schedule with Dr. Frankie Watson All,    Can you please schedule this patient with Dr. David for a two week follow up for depression and anxiety over the loss of her sister due to suicide?      Thank you.  Rae MAYNARD

## 2021-04-06 ENCOUNTER — OFFICE VISIT (OUTPATIENT)
Dept: PRIMARY CARE | Facility: CLINIC | Age: 46
End: 2021-04-06
Payer: COMMERCIAL

## 2021-04-06 VITALS
WEIGHT: 162.8 LBS | RESPIRATION RATE: 16 BRPM | BODY MASS INDEX: 28.84 KG/M2 | OXYGEN SATURATION: 99 % | HEART RATE: 62 BPM | SYSTOLIC BLOOD PRESSURE: 122 MMHG | DIASTOLIC BLOOD PRESSURE: 88 MMHG | HEIGHT: 63 IN | TEMPERATURE: 97.9 F

## 2021-04-06 DIAGNOSIS — F41.9 ANXIETY AND DEPRESSION: Primary | ICD-10-CM

## 2021-04-06 DIAGNOSIS — F32.A ANXIETY AND DEPRESSION: Primary | ICD-10-CM

## 2021-04-06 PROBLEM — J45.20 MILD INTERMITTENT ASTHMA WITHOUT COMPLICATION: Status: ACTIVE | Noted: 2021-04-06

## 2021-04-06 PROBLEM — J30.9 ALLERGIC RHINITIS: Status: ACTIVE | Noted: 2021-04-06

## 2021-04-06 PROCEDURE — 99213 OFFICE O/P EST LOW 20 MIN: CPT | Performed by: FAMILY MEDICINE

## 2021-04-06 PROCEDURE — 3008F BODY MASS INDEX DOCD: CPT | Performed by: FAMILY MEDICINE

## 2021-04-06 ASSESSMENT — ENCOUNTER SYMPTOMS
FEVER: 0
ARTHRALGIAS: 0
SHORTNESS OF BREATH: 0
ABDOMINAL PAIN: 0
WEAKNESS: 0
DIZZINESS: 0
DIARRHEA: 0
FATIGUE: 0
RHINORRHEA: 0
SINUS PAIN: 0
WHEEZING: 0
CHEST TIGHTNESS: 0
MYALGIAS: 0
SLEEP DISTURBANCE: 0
BRUISES/BLEEDS EASILY: 0
HEADACHES: 0
PALPITATIONS: 0
HEMATURIA: 0
EYE PAIN: 0
SORE THROAT: 0
NERVOUS/ANXIOUS: 0
FREQUENCY: 0
COUGH: 0
CHILLS: 0
SINUS PRESSURE: 0
CONSTIPATION: 0

## 2021-04-06 NOTE — PROGRESS NOTES
Subjective      Patient ID: Aidee Damico is a 45 y.o. female.    She is here for anxiety/depression follow up. Her sister killed herself back in Feb and she has been dealing with the death and planned the  and burial for her. She is using therapist weekly as well which has been helping. She did start zoloft back in mid March and has been doing pretty good with it. She is scheduled to see ob/gyn in next few months as well. She is eating pretty good overall and trying to stay active. She is still using ativan at night and would like to wean off this now. She is due for fasting labs.       The following have been reviewed and updated as appropriate in this visit:  Tobacco  Allergies  Meds  Problems  Med Hx  Surg Hx  Fam Hx       Review of Systems   Constitutional: Negative for chills, fatigue and fever.   HENT: Negative for ear pain, postnasal drip, rhinorrhea, sinus pressure, sinus pain and sore throat.    Eyes: Negative for pain and visual disturbance.   Respiratory: Negative for cough, chest tightness, shortness of breath and wheezing.    Cardiovascular: Negative for chest pain and palpitations.   Gastrointestinal: Negative for abdominal pain, constipation and diarrhea.   Genitourinary: Negative for decreased urine volume, frequency and hematuria.   Musculoskeletal: Negative for arthralgias and myalgias.   Skin: Negative for rash.   Neurological: Negative for dizziness, weakness and headaches.   Hematological: Does not bruise/bleed easily.   Psychiatric/Behavioral: Negative for behavioral problems, sleep disturbance and suicidal ideas. The patient is not nervous/anxious.        Current Outpatient Medications   Medication Sig Dispense Refill   • PROAIR HFA 90 mcg/actuation inhaler Inhale 2 puffs as needed.  0   • sertraline (ZOLOFT) 50 mg tablet Take 1 tablet (50 mg total) by mouth daily. 90 tablet 3     No current facility-administered medications for this visit.      Past Medical History:   Diagnosis  Date   • Asthma    • Unicornate uterus      Family History   Problem Relation Age of Onset   • Hypertension Biological Father    • Osteoporosis Biological Mother    • Thyroid disease Biological Mother    • Parkinsonism Paternal Grandmother    • Parkinsonism Maternal Grandmother    • Depression Biological Sister    • Thyroid disease Biological Sister    • Hypertension Biological Sister    • No Known Problems Paternal Grandfather    • Osteoporosis Mother's Sister      Past Surgical History:   Procedure Laterality Date   •  SECTION      x 2   • D&C FIRST TRIMESTER / TX INCOMPLETE / MISSED / SEPTIC / INDUCED      • FOOT FRACTURE SURGERY     • POSTPARTUM TUBAL LIGATION       Social History     Socioeconomic History   • Marital status:      Spouse name: Not on file   • Number of children: 2   • Years of education: Not on file   • Highest education level: Not on file   Occupational History   • Occupation: Retired   Social Needs   • Financial resource strain: Not on file   • Food insecurity     Worry: Not on file     Inability: Not on file   • Transportation needs     Medical: Not on file     Non-medical: Not on file   Tobacco Use   • Smoking status: Never Smoker   • Smokeless tobacco: Never Used   Substance and Sexual Activity   • Alcohol use: Yes     Frequency: 2-4 times a month     Drinks per session: 1 or 2     Binge frequency: Less than monthly   • Drug use: No   • Sexual activity: Yes     Partners: Male     Birth control/protection: Female Sterilization/Tubes Tied   Lifestyle   • Physical activity     Days per week: Not on file     Minutes per session: Not on file   • Stress: Not on file   Relationships   • Social connections     Talks on phone: Not on file     Gets together: Not on file     Attends Religion service: Not on file     Active member of club or organization: Not on file     Attends meetings of clubs or organizations: Not on file     Relationship status: Not on file   • Intimate  "partner violence     Fear of current or ex partner: Not on file     Emotionally abused: Not on file     Physically abused: Not on file     Forced sexual activity: Not on file   Other Topics Concern   • Not on file   Social History Narrative   • Not on file     Allergies   Allergen Reactions   • Ciprofloxacin Hives   • Gatifloxacin        Objective   Visit Vitals  /88 (BP Location: Left upper arm, Patient Position: Sitting)   Pulse 62   Temp 36.6 °C (97.9 °F) (Oral)   Resp 16   Ht 1.6 m (5' 3\")   Wt 73.8 kg (162 lb 12.8 oz)   LMP 04/03/2021 (Exact Date)   SpO2 99%   BMI 28.84 kg/m²     Physical Exam  Vitals signs and nursing note reviewed.   Constitutional:       Appearance: She is well-developed.   HENT:      Head: Normocephalic and atraumatic.      Right Ear: Tympanic membrane and external ear normal.      Left Ear: Tympanic membrane and external ear normal.      Nose: Nose normal.      Mouth/Throat:      Mouth: Mucous membranes are moist.      Pharynx: Oropharynx is clear. No posterior oropharyngeal erythema.   Eyes:      Conjunctiva/sclera: Conjunctivae normal.      Pupils: Pupils are equal, round, and reactive to light.   Neck:      Musculoskeletal: Normal range of motion and neck supple.      Thyroid: No thyromegaly.   Cardiovascular:      Rate and Rhythm: Normal rate and regular rhythm.      Heart sounds: Normal heart sounds. No murmur.   Pulmonary:      Effort: Pulmonary effort is normal.      Breath sounds: Normal breath sounds. No wheezing.   Abdominal:      General: Bowel sounds are normal.      Palpations: Abdomen is soft.      Tenderness: There is no abdominal tenderness.   Musculoskeletal: Normal range of motion.         General: No tenderness.   Lymphadenopathy:      Cervical: No cervical adenopathy.   Skin:     General: Skin is warm and dry.      Findings: No rash.   Neurological:      General: No focal deficit present.      Mental Status: She is alert and oriented to person, place, and time.    "   Cranial Nerves: No cranial nerve deficit.   Psychiatric:         Mood and Affect: Mood normal.         Behavior: Behavior normal.         Assessment/Plan   Problem List Items Addressed This Visit        Other    Anxiety and depression - Primary      She is doing well overall. She is tolerating the zoloft well and will cont current dosing and she will begin to wean off the ativan at night and let me know if she has any issues. She will cont weekly therapy. She will get fasting labs done and seeing annual ob/gyn soon for her appt. Will recheck her in 3 months.     Naz David, DO  4/6/2021

## 2021-04-13 DIAGNOSIS — Z23 ENCOUNTER FOR IMMUNIZATION: ICD-10-CM

## 2021-04-27 ENCOUNTER — APPOINTMENT (OUTPATIENT)
Dept: LAB | Facility: CLINIC | Age: 46
End: 2021-04-27
Attending: FAMILY MEDICINE
Payer: COMMERCIAL

## 2021-04-27 DIAGNOSIS — Z00.00 ROUTINE PHYSICAL EXAMINATION: ICD-10-CM

## 2021-04-27 LAB
ALBUMIN SERPL-MCNC: 4.1 G/DL (ref 3.4–5)
ALP SERPL-CCNC: 52 IU/L (ref 35–126)
ALT SERPL-CCNC: 15 IU/L (ref 11–54)
ANION GAP SERPL CALC-SCNC: 7 MEQ/L (ref 3–15)
AST SERPL-CCNC: 20 IU/L (ref 15–41)
BILIRUB SERPL-MCNC: 0.8 MG/DL (ref 0.3–1.2)
BUN SERPL-MCNC: 13 MG/DL (ref 8–20)
CALCIUM SERPL-MCNC: 9.6 MG/DL (ref 8.9–10.3)
CHLORIDE SERPL-SCNC: 104 MEQ/L (ref 98–109)
CHOLEST SERPL-MCNC: 223 MG/DL
CO2 SERPL-SCNC: 28 MEQ/L (ref 22–32)
CREAT SERPL-MCNC: 0.8 MG/DL (ref 0.6–1.1)
ERYTHROCYTE [DISTWIDTH] IN BLOOD BY AUTOMATED COUNT: 12.7 % (ref 11.7–14.4)
GFR SERPL CREATININE-BSD FRML MDRD: >60 ML/MIN/1.73M*2
GLUCOSE SERPL-MCNC: 83 MG/DL (ref 70–99)
HCT VFR BLDCO AUTO: 42.1 % (ref 35–45)
HDLC SERPL-MCNC: 75 MG/DL
HDLC SERPL: 3 {RATIO}
HGB BLD-MCNC: 13.7 G/DL (ref 11.8–15.7)
LDLC SERPL CALC-MCNC: 124 MG/DL
MCH RBC QN AUTO: 31.9 PG (ref 28–33.2)
MCHC RBC AUTO-ENTMCNC: 32.5 G/DL (ref 32.2–35.5)
MCV RBC AUTO: 98.1 FL (ref 83–98)
NONHDLC SERPL-MCNC: 148 MG/DL
PDW BLD AUTO: 12.9 FL (ref 9.4–12.3)
PLATELET # BLD AUTO: 204 K/UL (ref 150–369)
POTASSIUM SERPL-SCNC: 4.5 MEQ/L (ref 3.6–5.1)
PROT SERPL-MCNC: 6.4 G/DL (ref 6–8.2)
RBC # BLD AUTO: 4.29 M/UL (ref 3.93–5.22)
SODIUM SERPL-SCNC: 139 MEQ/L (ref 136–144)
TRIGL SERPL-MCNC: 119 MG/DL (ref 30–149)
TSH SERPL DL<=0.05 MIU/L-ACNC: 4.96 MIU/L (ref 0.34–5.6)
WBC # BLD AUTO: 5.29 K/UL (ref 3.8–10.5)

## 2021-04-27 PROCEDURE — 85027 COMPLETE CBC AUTOMATED: CPT

## 2021-04-27 PROCEDURE — 80061 LIPID PANEL: CPT

## 2021-04-27 PROCEDURE — 84443 ASSAY THYROID STIM HORMONE: CPT

## 2021-04-27 PROCEDURE — 36415 COLL VENOUS BLD VENIPUNCTURE: CPT

## 2021-04-27 PROCEDURE — 80053 COMPREHEN METABOLIC PANEL: CPT

## 2021-07-30 ENCOUNTER — OFFICE VISIT (OUTPATIENT)
Dept: PRIMARY CARE | Facility: CLINIC | Age: 46
End: 2021-07-30
Payer: COMMERCIAL

## 2021-07-30 VITALS
DIASTOLIC BLOOD PRESSURE: 78 MMHG | BODY MASS INDEX: 28.81 KG/M2 | RESPIRATION RATE: 16 BRPM | HEIGHT: 63 IN | HEART RATE: 66 BPM | SYSTOLIC BLOOD PRESSURE: 118 MMHG | TEMPERATURE: 97.5 F | WEIGHT: 162.6 LBS | OXYGEN SATURATION: 99 %

## 2021-07-30 DIAGNOSIS — F41.9 ANXIETY AND DEPRESSION: Primary | ICD-10-CM

## 2021-07-30 DIAGNOSIS — F32.A ANXIETY AND DEPRESSION: Primary | ICD-10-CM

## 2021-07-30 PROCEDURE — 99213 OFFICE O/P EST LOW 20 MIN: CPT | Performed by: FAMILY MEDICINE

## 2021-07-30 PROCEDURE — 3008F BODY MASS INDEX DOCD: CPT | Performed by: FAMILY MEDICINE

## 2021-07-30 ASSESSMENT — ENCOUNTER SYMPTOMS
SLEEP DISTURBANCE: 0
HEADACHES: 0
WEAKNESS: 0
ARTHRALGIAS: 0
HEMATURIA: 0
SINUS PRESSURE: 0
FATIGUE: 0
EYE PAIN: 0
SORE THROAT: 0
ABDOMINAL PAIN: 0
DIARRHEA: 0
SHORTNESS OF BREATH: 0
DYSURIA: 0
SINUS PAIN: 0
CHEST TIGHTNESS: 0
PALPITATIONS: 0
NERVOUS/ANXIOUS: 0
FREQUENCY: 0
CHILLS: 0
COUGH: 0
BRUISES/BLEEDS EASILY: 0
WHEEZING: 0
DIZZINESS: 0
FEVER: 0
RHINORRHEA: 0
BLOOD IN STOOL: 0
CONSTIPATION: 0
MYALGIAS: 0

## 2021-07-30 NOTE — PROGRESS NOTES
Subjective      Patient ID: Aidee Damico is a 45 y.o. female.    She is here for med check of zoloft. She is doing well overall. She started with zoloft awhile back and does much better now. She feels less anxious and feels more even in her emotions and not as sad and crying all the time. She is eating well and trying to stable active by playing tennis few times a wk. Scheduling mammo soon.       The following have been reviewed and updated as appropriate in this visit:  Tobacco  Allergies  Med Hx  Surg Hx  Fam Hx       Review of Systems   Constitutional: Negative for chills, fatigue and fever.   HENT: Negative for ear discharge, ear pain, postnasal drip, rhinorrhea, sinus pressure, sinus pain and sore throat.    Eyes: Negative for pain and visual disturbance.   Respiratory: Negative for cough, chest tightness, shortness of breath and wheezing.    Cardiovascular: Negative for chest pain and palpitations.   Gastrointestinal: Negative for abdominal pain, blood in stool, constipation and diarrhea.   Genitourinary: Negative for decreased urine volume, dysuria, frequency and hematuria.   Musculoskeletal: Negative for arthralgias and myalgias.   Skin: Negative for rash.   Neurological: Negative for dizziness, weakness and headaches.   Hematological: Does not bruise/bleed easily.   Psychiatric/Behavioral: Negative for behavioral problems, sleep disturbance and suicidal ideas. The patient is not nervous/anxious.        Current Outpatient Medications   Medication Sig Dispense Refill   • PROAIR HFA 90 mcg/actuation inhaler Inhale 2 puffs as needed.  0   • sertraline (ZOLOFT) 50 mg tablet Take 1 tablet (50 mg total) by mouth daily. 90 tablet 3     No current facility-administered medications for this visit.     Past Medical History:   Diagnosis Date   • Asthma    • Unicornate uterus      Family History   Problem Relation Age of Onset   • Hypertension Biological Father    • Osteoporosis Biological Mother    • Thyroid  disease Biological Mother    • Parkinsonism Paternal Grandmother    • Parkinsonism Maternal Grandmother    • Depression Biological Sister    • Thyroid disease Biological Sister    • Hypertension Biological Sister    • No Known Problems Paternal Grandfather    • Osteoporosis Mother's Sister      Past Surgical History:   Procedure Laterality Date   •  SECTION      x 2   • D&C FIRST TRIMESTER / TX INCOMPLETE / MISSED / SEPTIC / INDUCED      • FOOT FRACTURE SURGERY     • POSTPARTUM TUBAL LIGATION       Social History     Socioeconomic History   • Marital status:      Spouse name: Not on file   • Number of children: 2   • Years of education: Not on file   • Highest education level: Not on file   Occupational History   • Occupation: Retired   Tobacco Use   • Smoking status: Never Smoker   • Smokeless tobacco: Never Used   Vaping Use   • Vaping Use: Never used   Substance and Sexual Activity   • Alcohol use: Yes   • Drug use: No   • Sexual activity: Yes     Partners: Male     Birth control/protection: Female Sterilization/Tubes Tied   Other Topics Concern   • Not on file   Social History Narrative   • Not on file     Social Determinants of Health     Financial Resource Strain:    • Difficulty of Paying Living Expenses:    Food Insecurity:    • Worried About Running Out of Food in the Last Year:    • Ran Out of Food in the Last Year:    Transportation Needs:    • Lack of Transportation (Medical):    • Lack of Transportation (Non-Medical):    Physical Activity:    • Days of Exercise per Week:    • Minutes of Exercise per Session:    Stress:    • Feeling of Stress :    Social Connections:    • Frequency of Communication with Friends and Family:    • Frequency of Social Gatherings with Friends and Family:    • Attends [employer] Services:    • Active Member of Clubs or Organizations:    • Attends Club or Organization Meetings:    • Marital Status:    Intimate Partner Violence:    • Fear of Current or  "Ex-Partner:    • Emotionally Abused:    • Physically Abused:    • Sexually Abused:      Allergies   Allergen Reactions   • Ciprofloxacin Hives   • Gatifloxacin        Objective   Visit Vitals  /78 (BP Location: Left upper arm, Patient Position: Sitting)   Pulse 66   Temp 36.4 °C (97.5 °F) (Temporal)   Resp 16   Ht 1.6 m (5' 3\")   Wt 73.8 kg (162 lb 9.6 oz)   SpO2 99%   BMI 28.80 kg/m²     Physical Exam  Vitals and nursing note reviewed.   Constitutional:       Appearance: Normal appearance.   HENT:      Right Ear: Tympanic membrane normal. There is no impacted cerumen.      Left Ear: Tympanic membrane normal. There is no impacted cerumen.      Nose: Nose normal. No rhinorrhea.      Mouth/Throat:      Mouth: Mucous membranes are moist.      Pharynx: Oropharynx is clear. No posterior oropharyngeal erythema.   Cardiovascular:      Rate and Rhythm: Normal rate and regular rhythm.      Heart sounds: No murmur heard.     Pulmonary:      Effort: Pulmonary effort is normal.      Breath sounds: Normal breath sounds. No wheezing.   Abdominal:      General: Bowel sounds are normal.      Palpations: Abdomen is soft.      Tenderness: There is no abdominal tenderness.   Musculoskeletal:         General: Normal range of motion.      Cervical back: Normal range of motion and neck supple.   Skin:     General: Skin is warm.      Findings: No rash.   Neurological:      General: No focal deficit present.      Mental Status: She is alert and oriented to person, place, and time.      Cranial Nerves: No cranial nerve deficit.   Psychiatric:         Mood and Affect: Mood normal.         Behavior: Behavior normal.         Assessment/Plan   Problem List Items Addressed This Visit        Other    Anxiety and depression - Primary      She is doing better with the zoloft so will cont current dose and recheck her in 6 months for her PE. She will complete her mammo soon as well. Any changes in her health she will let me know. She will also " think about the pneumonia vaccine as well.     Naz David, DO  7/30/2021

## 2021-08-09 ENCOUNTER — TELEPHONE (OUTPATIENT)
Dept: PRIMARY CARE | Facility: CLINIC | Age: 46
End: 2021-08-09

## 2021-08-09 NOTE — TELEPHONE ENCOUNTER
Received a fax request from [facility] at [address] for a refill of Sertraline. Pt previously used [facility] - [address]. Calling pt to confirm she wishes to switch pharmacies

## 2022-01-18 ENCOUNTER — OFFICE VISIT (OUTPATIENT)
Dept: GYNECOLOGY | Facility: CLINIC | Age: 47
End: 2022-01-18
Payer: COMMERCIAL

## 2022-01-18 VITALS — WEIGHT: 174 LBS | BODY MASS INDEX: 30.83 KG/M2 | HEIGHT: 63 IN

## 2022-01-18 DIAGNOSIS — N92.4 PREMENOPAUSAL MENORRHAGIA: ICD-10-CM

## 2022-01-18 DIAGNOSIS — N39.3 SUI (STRESS URINARY INCONTINENCE, FEMALE): ICD-10-CM

## 2022-01-18 DIAGNOSIS — Z01.419 ENCOUNTER FOR GYNECOLOGICAL EXAMINATION WITHOUT ABNORMAL FINDING: Primary | ICD-10-CM

## 2022-01-18 PROCEDURE — S0610 ANNUAL GYNECOLOGICAL EXAMINA: HCPCS | Performed by: OBSTETRICS & GYNECOLOGY

## 2022-01-18 PROCEDURE — 99214 OFFICE O/P EST MOD 30 MIN: CPT | Mod: 25 | Performed by: OBSTETRICS & GYNECOLOGY

## 2022-01-18 PROCEDURE — 3008F BODY MASS INDEX DOCD: CPT | Performed by: OBSTETRICS & GYNECOLOGY

## 2022-01-18 RX ORDER — TRANEXAMIC ACID 650 MG/1
TABLET ORAL
Qty: 90 TABLET | Refills: 1 | Status: SHIPPED | OUTPATIENT
Start: 2022-01-18 | End: 2022-05-28

## 2022-01-18 NOTE — PROGRESS NOTES
Chief Complaint:  Annual and problem  Last visit here 2018  Pap then neg/neg  Mammo and colon cancer screening due.  No sexual issues, sexually active male partner, min decrease in libido not an issue    Concerned about change in menses, was q 28 days, now 27-40, heavier first 2 days then spotting. Was heavy in the past but this is worse Also notes vasomotor sxs these are now rare    HPI:  2022  Aidee Damico is a 46 y.o. female who presents for annual exam.   Periods are regular every 27-40 days, lasting 3 days. Dysmenorrhea:none.     The patient is sexually active. GYN screening history: last pap: was normal. Domestic violence: no.     Current contraception: tubal ligation  History of abnormal Pap smear: no:    Family history of uterine or ovarian cancer: yes - mom uterine  History of abnormal mammogram: no:  Family history of breast cancer: no    OB History: Menstrual History:  OB History        7    Para   2    Term   1       1    AB   4    Living   2       SAB   4    IAB   0    Ectopic   0    Multiple   0    Live Births   2           Obstetric Comments   No gdm or hbp            Patient's last menstrual period was 2022.         Medical History:   Past Medical History:   Diagnosis Date   • Asthma    • Unicornate uterus        Surgical History:   Past Surgical History:   Procedure Laterality Date   •  SECTION      x 2   • D&C FIRST TRIMESTER / TX INCOMPLETE / MISSED / SEPTIC / INDUCED      • FOOT FRACTURE SURGERY     • POSTPARTUM TUBAL LIGATION         Social History:   Social History     Socioeconomic History   • Marital status:      Spouse name: None   • Number of children: 2   • Years of education: None   • Highest education level: None   Occupational History   • Occupation: Retired   Tobacco Use   • Smoking status: Never Smoker   • Smokeless tobacco: Never Used   Vaping Use   • Vaping Use: Never used   Substance and Sexual Activity   • Alcohol use: Yes   • Drug  "use: No   • Sexual activity: Yes     Partners: Male     Birth control/protection: Female Sterilization/Tubes Tied   Other Topics Concern   • None   Social History Narrative   • None     Social Determinants of Health     Financial Resource Strain: Not on file   Food Insecurity: Not on file   Transportation Needs: Not on file   Physical Activity: Not on file   Stress: Not on file   Social Connections: Not on file   Intimate Partner Violence: Not on file   Housing Stability: Not on file       Family History:   Family History   Problem Relation Age of Onset   • Hypertension Biological Father    • Osteoporosis Biological Mother    • Thyroid disease Biological Mother    • Uterine cancer Biological Mother    • Parkinsonism Paternal Grandmother    • Parkinsonism Maternal Grandmother    • Depression Biological Sister    • Thyroid disease Biological Sister    • Hypertension Biological Sister    • No Known Problems Paternal Grandfather    • Osteoporosis Mother's Sister        Current Medications:   Current Outpatient Medications   Medication Sig Dispense Refill   • PROAIR HFA 90 mcg/actuation inhaler Inhale 2 puffs as needed.  0   • sertraline (ZOLOFT) 50 mg tablet Take 1 tablet (50 mg total) by mouth daily. 90 tablet 3   • tranexamic acid 650 mg tablet 2 po tid days first 3 - 5 days of your period 90 tablet 1     No current facility-administered medications for this visit.       Allergies: Ciprofloxacin and Gatifloxacin      Review of Systems  Constitutional: negative  Gastrointestinal:  negative: abdominal pain  Genitourinary:positive for nocturia, urinary incontinence and ADAN. Nocturia is twice a night  Reproductive:she complains of irr menses  Integument/breast: negative for breast lump and breast tenderness  Musculoskeletal:negative  Neurological: negative  Behavioral/Psych: positive for depression, grief over sister's suicide  Endocrine: negative    Physical Exam  Visit Vitals  Ht 1.6 m (5' 3\")   Wt 78.9 kg (174 lb) "   LMP 01/03/2022   BMI 30.82 kg/m²        General Appearance: Alert, cooperative, no acute distress  Head: Normocephalic, without obvious abnormality, atraumatic    Nodes: No enlargement of axillary or supraclavicular nodes  Thyroid: no enlargement/tenderness/nodules    Breast: no masses, nontender and no discharge  Abdomen: Soft, nontender, nondistended,   no masses, no organomegaly  Back: nl  Pelvic:    Vulva normal  Urethra: nl  Vagina normal mucosa  Cervix : nullip  Uterus normal size, anteverted  Adnexa no mass, fullness, tenderness  Rectal:deferred   Extremities:nl  Musculoskeletal:nl    Skin: Skin color, texture, turgor normal, no rashes or lesions    Neurologic:oriented and  memory intact  Psych:normal affect and behavior appropriate      Assessment & Plan    Problem List Items Addressed This Visit        Genitourinary    Premenopausal menorrhagia    Overview     Nl perimenopause pattern check endo bx and US  Has unicornuate uterus therefore can't manage bleeding with IUD. Declines oc's. Can treat with TXA.  Check cbc         Relevant Orders    CBC and Differential    Ferritin    US PELVIS TRANSABDOMINAL & TRANSVAGINAL    ADAN (stress urinary incontinence, female)    Overview     Not currently bothersome           Other Visit Diagnoses     Encounter for gynecological examination without abnormal finding    -  Primary          Return in about 5 weeks (around 2/22/2022) for endo bx.  Nadja Caceres MD

## 2022-01-27 ENCOUNTER — TELEPHONE (OUTPATIENT)
Dept: PRIMARY CARE | Facility: CLINIC | Age: 47
End: 2022-01-27
Payer: COMMERCIAL

## 2022-01-27 DIAGNOSIS — F43.23 ADJUSTMENT REACTION WITH ANXIETY AND DEPRESSION: ICD-10-CM

## 2022-01-27 RX ORDER — SERTRALINE HYDROCHLORIDE 50 MG/1
75 TABLET, FILM COATED ORAL DAILY
Qty: 30 TABLET | Refills: 3
Start: 2022-01-27 | End: 2022-01-28 | Stop reason: SDUPTHER

## 2022-01-27 NOTE — TELEPHONE ENCOUNTER
Patient is almost out of medication and called for a refill but would also like to have a call back from Dr. David or nurse to discuss a possible increase in dosage. Please call patient and advise.     Name of medication requested: sertraline (ZOLOFT) 50 mg tablet  Medication Strength: 50mg  Mediation Directions:   Quantity Requested (example 30/90): 30  Number of refills requested:       [facility]/pharmacy #[id number] - [place] - [address]  [place] [address]  Phone: [phone] Fax: [phone]    Additional Comments:    Next Encounter with this provider: Visit date not found

## 2022-01-27 NOTE — TELEPHONE ENCOUNTER
Spoke with Aidee, she is aware to increase her Zoloft to 75mg and will keep us updated over the next months on how she is doing.

## 2022-01-27 NOTE — TELEPHONE ENCOUNTER
Spoke with Aidee, she reports that she feels like Zoloft 50mg is no longer working for her. She noticed this around the new years. She is taking more frequent napes and feels like she is dealing with worsening depression. She would like to know if she can increase her Zoloft to 75mg and see how she does with that dosage. Please advise

## 2022-01-27 NOTE — TELEPHONE ENCOUNTER
Spoke to patient and she would like to change her dosage of the medication of Zoloft. Would she need to come in the office for a visit?

## 2022-01-27 NOTE — TELEPHONE ENCOUNTER
Let her know she can increase the zoloft to 75mg (1 1/2 tab) daily and see how she does over next month or so

## 2022-04-28 ENCOUNTER — TELEPHONE (OUTPATIENT)
Dept: GYNECOLOGY | Facility: CLINIC | Age: 47
End: 2022-04-28
Payer: COMMERCIAL

## 2022-04-28 DIAGNOSIS — Z12.31 SCREENING MAMMOGRAM, ENCOUNTER FOR: Primary | ICD-10-CM

## 2022-04-28 NOTE — TELEPHONE ENCOUNTER
Dr. Caceres,         Pt called and requested a mammo script, she is getting it done at main line  [facility] location.          Thank you        Please call when done

## 2022-05-28 ENCOUNTER — NURSE TRIAGE (OUTPATIENT)
Dept: PRIMARY CARE | Facility: CLINIC | Age: 47
End: 2022-05-28
Payer: COMMERCIAL

## 2022-05-28 DIAGNOSIS — U07.1 COVID-19: Primary | ICD-10-CM

## 2022-05-28 RX ORDER — ALBUTEROL SULFATE 90 UG/1
2 AEROSOL, METERED RESPIRATORY (INHALATION) EVERY 6 HOURS PRN
Qty: 18 G | Refills: 0 | Status: SHIPPED | OUTPATIENT
Start: 2022-05-28

## 2022-05-28 NOTE — TELEPHONE ENCOUNTER
"On call - patient called for covid symptoms. Reported symptoms started yesterday. Went to urgent care. Strep negative. Covid test came back positive. Fever. Temp 101F. Coughing. Chest heaviness. Sore throat. Headache. No sinus pain or ear pain. No nausea or vomiting. No chest pain. No shortness of breath or wheezing. No abd pain or diarrhea. Fatigue. Vaccinated and boosted. Has asthma. Need refill on inhaler.     1. COVID-19  Discussed about paxlovid. Pt meets criteria. Will start paxlovid. Can also take mucinex as needed. Stay hydrated. Call if symptoms worsen. F/u with PCP next week.     Reason for Disposition  • [1] COVID-19 diagnosed by positive lab test (e.g., PCR, rapid self-test kit) AND [2] mild symptoms (e.g., cough, fever, others) AND [3] no complications or SOB    Answer Assessment - Initial Assessment Questions  1. COVID-19 DIAGNOSIS: \"Who made your COVID-19 diagnosis?\" \"Was it confirmed by a positive lab test or self-test?\" If not diagnosed by a doctor (or NP/PA), ask \"Are there lots of cases (community spread) where you live?\" Note: See public health department website, if unsure.      Did rapid test at the urgent care today positive.     2. COVID-19 EXPOSURE: \"Was there any known exposure to COVID before the symptoms began?\" CDC Definition of close contact: within 6 feet (2 meters) for a total of 15 minutes or more over a 24-hour period.       Not sure    3. ONSET: \"When did the COVID-19 symptoms start?\"       5/27    4. WORST SYMPTOM: \"What is your worst symptom?\" (e.g., cough, fever, shortness of breath, muscle aches)      Fever. Temp 101. Coughing. Chest heaviness. Sore throat. Headache. No sinus pain or ear pain. No nausea or vomiting. No chest pain. No shortness of breath or wheezing. No abd pain or diarrhea. Fatigue.     5. COUGH: \"Do you have a cough?\" If Yes, ask: \"How bad is the cough?\"        Yes. Severe.    6. FEVER: \"Do you have a fever?\" If Yes, ask: \"What is your temperature, how was it " "measured, and when did it start?\"      Yes. Temp 101    7. RESPIRATORY STATUS: \"Describe your breathing?\" (e.g., shortness of breath, wheezing, unable to speak)       No shortness of breath or wheezing    8. BETTER-SAME-WORSE: \"Are you getting better, staying the same or getting worse compared to yesterday?\"  If getting worse, ask, \"In what way?\"      Worse. Had cough, headache and sore throat.     9. HIGH RISK DISEASE: \"Do you have any chronic medical problems?\" (e.g., asthma, heart or lung disease, weak immune system, obesity, etc.)      Asthma.     10. VACCINE: \"Have you had the COVID-19 vaccine?\" If Yes, ask: \"Which one, how many shots, when did you get it?\"        Yes. moderna    11. BOOSTER: \"Have you received your COVID-19 booster?\" If Yes, ask: \"Which one and when did you get it?\"        Yes moderna    12. PREGNANCY: \"Is there any chance you are pregnant?\" \"When was your last menstrual period?\"        No     13. OTHER SYMPTOMS: \"Do you have any other symptoms?\"  (e.g., chills, fatigue, headache, loss of smell or taste, muscle pain, sore throat)        Fever. Temp 101. Coughing. Chest heaviness. Sore throat. Headache. No sinus pain or ear pain. No nausea or vomiting. No chest pain. No shortness of breath or wheezing. No abd pain or diarrhea. Fatigue.     14. O2 SATURATION MONITOR:  \"Do you use an oxygen saturation monitor (pulse oximeter) at home?\" If Yes, ask \"What is your reading (oxygen level) today?\" \"What is your usual oxygen saturation reading?\" (e.g., 95%)        Yes. Did not check. Recommend checking.    Protocols used: CORONAVIRUS (COVID-19) DIAGNOSED OR SUSPECTED-ADULT-    PAXLOVID Patient Eligibility Screening Checklist     If the answer to ALL points is Yes, patient considered eligible for Paxlovid prescription.     · Positive SARS-CoV-2 test (Confirmation of a positive home rapid SARS-CoV-2 test result with additional direct SARS-CoV-2 viral testing is not required.) yes    · Age >/= 18 years " OR > 12 years of age and weighing at least 40 kg yes       · Has one or more risk factors for progression to severe COVID-19 (Risk factors have changed over time, and additional risk factors [such as being unvaccinated or having not received a booster] could be considered. Healthcare providers should consider the benefit-risk for an individual patient. yes     · Symptoms consistent with mild to moderate COVID-19 yes    · Symptom onset within 5 days (Prescriber is encouraged to include a note to the pharmacist in the prescription stating: Please fill prescription by [insert date]. This prescription fill by date is within 5 days from symptom onset and complies with the patient eligibility criteria under the EUA.) yes    · Not requiring hospitalization due to severe or critical COVID-19 at treatment initiation yes    · No known or suspected severe renal impairment (eGFR < 30 mL/min) yes  eGFR   Date Value Ref Range Status   04/27/2021 >60.0 >=60.0 mL/min/1.73m*2 Final   ·   · Note that a dose reduction is required for patients with moderate renal impairment (eGFR >/= 30-<60 mL/min); see the Fact Sheet for Healthcare Providers.  · Prescriber may rely on patient history and access to the patient's health records to make an assessment regarding the likelihood of renal impairment. Providers may consider ordering a serum creatinine or calculating the estimated glomerular filtration rate (eGFR) for certain patients after assessment on a case-by-case basis based on history or exam.    · No known or suspected severe hepatic impairment (Child-Lama Class C) yes    · No history of clinically significant hypersensitivity reactions [e.g., toxic epidermal necrolysis (TEN) or Faulkner-Tani syndrome] to the active ingredients (nirmatrelvir or ritonavir) or other components of the product yes    Additional Considerations if prescribing Paxlovid:    · Concomitant medications reviewed for potential interactions including but not  limited to the considerations below (YES/NO:[id number])  · HMG-CoA reductase inhibitors (statins)   · Patient is taking lovastatin or simvastatin, which are contraindicated with PAXLOVID coadministration: The statin can be held 12 hours prior to the first dose of PAXLOVID treatment, held during the 5 days of treatment, and restarted 5 days after completing PAXLOVID.  · Patient is taking atorvastatin or rosuvastatin: Temporary discontinuation of atorvastatin and rosuvastatin during treatment with PAXLOVID should be considered depending on statin dose. Atorvastatin and rosuvastatin do not need to be held prior to or after completing PAXLOVID.  · Hormonal contraceptives containing ethinyl estradiol: Patient is taking a hormonal contraceptive containing ethinyl estradiol:   · The need for an additional non-hormonal method of contraception during the 5 days of PAXLOVID treatment and until one menstrual cycle after stopping PAXLOVID should be recommended.   · Medications for HIV-1 Treatment: Patient is taking medications for the treatment of HIV-1 infection:   · With the exception of maraviroc3, HIV antiretroviral medications can be co-administered with PAXLOVID without dose adjustment, but arranging follow-up by the HIV care provider to monitor for side effects is recommended    · Potential side effects including altered sense of taste, diarrhea, increased blood pressure and muscle aches reviewed with patient yes    · The patient is not taking any medications that are contraindicated with Paxlovid coadministration yes    If the answer to ALL points is Yes, patient considered eligible for Paxlovid prescription.     Patient meets criteria for Paxlovid, communicates understanding of the risk and benefits and prescription electronically sent to [facility].     PAXLOVID Patient Eligibility Screening Checklist Tool for Prescribers    Disposition:  See PCP When Office is Open (Within 3 Days), Home Care  Care Advice:  Care  Advice Given     Given By Given At Modified    Lacie Smallwood CRNP 5/28/2022  2:48 PM No    COUGH MEDICINES:   * COUGH DROPS: Over-the-counter cough drops can help a lot, especially for mild coughs. They soothe an irritated throat and remove the tickle sensation in the back of the throat. Cough drops are easy to carry with you.  * COUGH SYRUP WITH DEXTROMETHORPHAN: An over-the-counter cough syrup can help your cough. The most common cough suppressant in over-the-counter cough medicines is dextromethorphan.  * HOME REMEDY - HARD CANDY: Hard candy works just as well as over-the-counter cough drops. People who have diabetes should use sugar-free candy.  * HOME REMEDY - HONEY: This old home remedy has been shown to help decrease coughing at night. The adult dosage is 2 teaspoons (10 ml) at bedtime.    Lacie Smallwood CRNP 5/28/2022  2:48 PM No    PAIN AND FEVER MEDICINES:  * For pain or fever relief, take either acetaminophen or ibuprofen.  * They are over-the-counter (OTC) drugs that help treat both fever and pain. You can buy them at the drugstore.  * Treat fevers above 101 F (38.3 C). The goal of fever therapy is to bring the fever down to a comfortable level. Remember that fever medicine usually lowers fever 2 degrees F (1 - 1 1/2 degrees C).  * ACETAMINOPHEN REGULAR STRENGTH TYLENOL: Take 650 mg (two 325 mg pills) by mouth every 4 to 6 hours as needed. Each Regular Strength Tylenol pill has 325 mg of acetaminophen. The most you should take each day is 3,250 mg (10 pills a day).  * ACETAMINOPHEN - EXTRA STRENGTH TYLENOL: Take 1,000 mg (two 500 mg pills) every 8 hours as needed. Each Extra Strength Tylenol pill has 500 mg of acetaminophen. The most you should take each day is 3,000 mg (6 pills a day).  * IBUPROFEN (E.G., MOTRIN, ADVIL): Take 400 mg (two 200 mg pills) by mouth every 6 hours. The most you should take each day is 1,200 mg (six 200 mg pills), unless your doctor has told you to take more.    Lacie Smallwood CRNP  5/28/2022  2:48 PM No    COVID-19 - HOW TO PROTECT OTHERS - WHEN YOU ARE SICK WITH COVID-19:  * STAY HOME A MINIMUM OF 5 DAYS: Home isolation is needed for at least 5 days after the symptoms started. Stay home from school or work if you are sick. Do NOT go to Y[employer] services,  centers, shopping, or other public places. Do NOT use public transportation (e.g., bus, taxis, ride-sharing). Do NOT allow any visitors to your home. Leave the house only if you need to seek urgent medical care.  * WEAR A MASK FOR 10 DAYS: Wear a well-fitted mask for 10 full days any time you are around others inside your home or in public. Do not go to places where you are unable to wear a mask.  * WASH HANDS OFTEN: Wash hands often with soap and water. After coughing or sneezing are important times. If soap and water are not available, use an alcohol-based hand  with at least 60% alcohol, covering all surfaces of your hands and rubbing them together until they feel dry. Avoid touching your eyes, nose, and mouth with unwashed hands.  * CALL AHEAD IF MEDICAL CARE IS NEEDED: If you have a medical appointment, call your doctor's office and tell them you have or may have COVID-19. This will help the office protect themselves and other patients. They will give you directions.    Lacie Smallwood CRNP 5/28/2022  2:48 PM No    CALL BACK IF:   * Fever over 103 F (39.4 C)  * Fever lasts over 3 days  * Fever returns after being gone for 24 hours  * Chest pain or difficulty breathing occurs  * You become worse       Patient/Caregiver understands and will follow care advice?  Yes, plans to follow advice        Orders Placed This Encounter:  Orders Placed This Encounter   • PROAIR HFA 90 mcg/actuation inhaler     Sig: Inhale 2 puffs every 6 (six) hours as needed for wheezing or shortness of breath.     Dispense:  18 g     Refill:  0   • nirmatrelvir-ritonavir (PAXLOVID) tablet dose package     Sig: Take 3 tablets by mouth 2 (two) times a  day for 5 days. Each dose is 2 tablets of nirmatrelvir and one tablet of ritonavir     Dispense:  30 tablet     Refill:  0

## 2022-07-11 ENCOUNTER — TELEPHONE (OUTPATIENT)
Dept: PRIMARY CARE | Facility: CLINIC | Age: 47
End: 2022-07-11

## 2022-07-11 NOTE — TELEPHONE ENCOUNTER
Matilde called state she had covid over memorial day weekend and wanted to know can yo catch it twice? [facility] but wanted to speak to a nurse

## 2023-02-04 DIAGNOSIS — F43.23 ADJUSTMENT REACTION WITH ANXIETY AND DEPRESSION: ICD-10-CM

## 2023-02-07 RX ORDER — SERTRALINE HYDROCHLORIDE 50 MG/1
TABLET, FILM COATED ORAL
Qty: 45 TABLET | Refills: 0 | Status: SHIPPED | OUTPATIENT
Start: 2023-02-07 | End: 2023-03-18 | Stop reason: SDUPTHER

## 2023-03-09 DIAGNOSIS — F43.23 ADJUSTMENT REACTION WITH ANXIETY AND DEPRESSION: ICD-10-CM

## 2023-03-09 RX ORDER — SERTRALINE HYDROCHLORIDE 50 MG/1
TABLET, FILM COATED ORAL
Qty: 45 TABLET | Refills: 0 | OUTPATIENT
Start: 2023-03-09

## 2023-03-18 ENCOUNTER — NURSE TRIAGE (OUTPATIENT)
Dept: PRIMARY CARE | Facility: CLINIC | Age: 48
End: 2023-03-18
Payer: COMMERCIAL

## 2023-03-18 DIAGNOSIS — F43.23 ADJUSTMENT REACTION WITH ANXIETY AND DEPRESSION: ICD-10-CM

## 2023-03-18 RX ORDER — SERTRALINE HYDROCHLORIDE 50 MG/1
75 TABLET, FILM COATED ORAL DAILY
Qty: 7 TABLET | Refills: 0 | Status: SHIPPED | OUTPATIENT
Start: 2023-03-18 | End: 2023-04-28 | Stop reason: SDUPTHER

## 2023-03-20 DIAGNOSIS — F43.23 ADJUSTMENT REACTION WITH ANXIETY AND DEPRESSION: ICD-10-CM

## 2023-03-20 RX ORDER — SERTRALINE HYDROCHLORIDE 50 MG/1
TABLET, FILM COATED ORAL
Qty: 45 TABLET | Refills: 0 | Status: SHIPPED | OUTPATIENT
Start: 2023-03-20 | End: 2023-07-03

## 2023-03-20 RX ORDER — SERTRALINE HYDROCHLORIDE 50 MG/1
TABLET, FILM COATED ORAL
Qty: 45 TABLET | Refills: 0 | Status: SHIPPED | OUTPATIENT
Start: 2023-03-20 | End: 2023-03-20 | Stop reason: SDUPTHER

## 2023-03-20 NOTE — TELEPHONE ENCOUNTER
Patient called on call for refill of zoloft- hasn't been seen since 2021 and on call nurse called in 7 pills so she really needs an appt to continue the medication so please refill one month supply to give her enough time to get scheduled but needs med check thanks

## 2023-03-22 ENCOUNTER — TELEPHONE (OUTPATIENT)
Dept: PRIMARY CARE | Facility: CLINIC | Age: 48
End: 2023-03-22
Payer: COMMERCIAL

## 2023-03-22 NOTE — TELEPHONE ENCOUNTER
Left voicemail for patient in attempt to get her scheduled for an in-office med check with Dr. David.

## 2023-04-26 ENCOUNTER — TELEPHONE (OUTPATIENT)
Dept: PRIMARY CARE | Facility: CLINIC | Age: 48
End: 2023-04-26
Payer: COMMERCIAL

## 2023-04-26 NOTE — TELEPHONE ENCOUNTER
Patient left voicemail stating pharmacy was unable to reach our office to get prescription Zoloft refilled. Patient is out of medication and needs prescription sent to [facility] on file. Any questions, patient can be reached at [phone]. Please advise. Thank you.

## 2023-04-27 NOTE — TELEPHONE ENCOUNTER
Left voicemail for patient making her aware that prescription cannot be refilled until appointment is scheduled with Dr. David.

## 2023-04-28 DIAGNOSIS — F43.23 ADJUSTMENT REACTION WITH ANXIETY AND DEPRESSION: ICD-10-CM

## 2023-04-28 RX ORDER — SERTRALINE HYDROCHLORIDE 50 MG/1
75 TABLET, FILM COATED ORAL DAILY
Qty: 45 TABLET | Refills: 1 | Status: SHIPPED | OUTPATIENT
Start: 2023-04-28 | End: 2023-07-03 | Stop reason: SDUPTHER

## 2023-04-28 NOTE — TELEPHONE ENCOUNTER
Medicine Refill Request    Last Office: 7/30/2021   Last Consult Visit: Visit date not found  Last Telemedicine Visit: 3/18/2021 Rae Zazueta CRNP    Next Appointment: 6/23/2023      Current Outpatient Medications:   •  sertraline (ZOLOFT) 50 mg tablet, Take 1.5 tablets (75 mg total) by mouth daily., Disp: 7 tablet, Rfl: 0

## 2023-06-26 ENCOUNTER — TELEPHONE (OUTPATIENT)
Dept: PRIMARY CARE | Facility: CLINIC | Age: 48
End: 2023-06-26
Payer: COMMERCIAL

## 2023-06-26 DIAGNOSIS — F43.23 ADJUSTMENT REACTION WITH ANXIETY AND DEPRESSION: ICD-10-CM

## 2023-06-26 RX ORDER — SERTRALINE HYDROCHLORIDE 50 MG/1
75 TABLET, FILM COATED ORAL DAILY
Qty: 45 TABLET | Refills: 1 | OUTPATIENT
Start: 2023-06-26

## 2023-07-03 RX ORDER — SERTRALINE HYDROCHLORIDE 50 MG/1
75 TABLET, FILM COATED ORAL DAILY
Qty: 45 TABLET | Refills: 1 | Status: SHIPPED | OUTPATIENT
Start: 2023-07-03

## 2023-07-03 NOTE — TELEPHONE ENCOUNTER
Patient is completely out of medication and would like medication refilled today.      Medication Request   Patient PCP: Naz David, DO  Next Office Visit: 8/9/2023  Has this provider prescribed this medication before?: yes  Medication Name: sertraline (ZOLOFT)   Medication Dose: 50 mg  Medication Frequency: Take 1.5 tablets (75 mg total) by mouth daily.  Preferred Pharmacy:   [facility]/pharmacy #[id number] - [place] - [address]  [address] [place] [address]  Phone: [phone] Fax: [phone]      Please allow 2 business days for your provider to send your medication request or to reach out to discuss.

## 2023-07-28 ENCOUNTER — HOSPITAL ENCOUNTER (OUTPATIENT)
Dept: RADIOLOGY | Facility: CLINIC | Age: 48
Discharge: HOME | End: 2023-07-28
Attending: FAMILY MEDICINE
Payer: COMMERCIAL

## 2023-07-28 DIAGNOSIS — Z12.31 ENCOUNTER FOR SCREENING MAMMOGRAM FOR MALIGNANT NEOPLASM OF BREAST: ICD-10-CM

## 2023-07-28 DIAGNOSIS — Z12.31 ENCOUNTER FOR SCREENING MAMMOGRAM FOR MALIGNANT NEOPLASM OF BREAST: Primary | ICD-10-CM

## 2023-07-28 PROCEDURE — 77063 BREAST TOMOSYNTHESIS BI: CPT

## 2023-07-28 PROCEDURE — 77067 SCR MAMMO BI INCL CAD: CPT

## 2025-04-30 ENCOUNTER — APPOINTMENT (EMERGENCY)
Dept: RADIOLOGY | Facility: HOSPITAL | Age: 50
End: 2025-04-30
Payer: COMMERCIAL

## 2025-04-30 ENCOUNTER — HOSPITAL ENCOUNTER (OUTPATIENT)
Facility: HOSPITAL | Age: 50
Setting detail: OBSERVATION
Discharge: HOME | End: 2025-05-02
Attending: EMERGENCY MEDICINE | Admitting: SURGERY
Payer: COMMERCIAL

## 2025-04-30 DIAGNOSIS — K35.80 ACUTE APPENDICITIS, UNSPECIFIED ACUTE APPENDICITIS TYPE: ICD-10-CM

## 2025-04-30 DIAGNOSIS — R10.9 ABDOMINAL PAIN, UNSPECIFIED ABDOMINAL LOCATION: Primary | ICD-10-CM

## 2025-04-30 LAB
ALBUMIN SERPL-MCNC: 4.2 G/DL (ref 3.5–5.7)
ALP SERPL-CCNC: 53 IU/L (ref 34–125)
ALT SERPL-CCNC: 14 IU/L (ref 7–52)
ANION GAP SERPL CALC-SCNC: 9 MEQ/L (ref 3–15)
AST SERPL-CCNC: 16 IU/L (ref 13–39)
BASOPHILS # BLD: 0.04 K/UL (ref 0.01–0.1)
BASOPHILS NFR BLD: 0.3 %
BILIRUB SERPL-MCNC: 0.5 MG/DL (ref 0.3–1.2)
BILIRUB UR QL STRIP.AUTO: NEGATIVE MG/DL
BUN SERPL-MCNC: 13 MG/DL (ref 7–25)
CALCIUM SERPL-MCNC: 10 MG/DL (ref 8.6–10.3)
CHLORIDE SERPL-SCNC: 104 MEQ/L (ref 98–107)
CLARITY UR REFRACT.AUTO: CLEAR
CO2 SERPL-SCNC: 25 MEQ/L (ref 21–31)
COLOR UR AUTO: YELLOW
CREAT SERPL-MCNC: 0.8 MG/DL (ref 0.6–1.2)
DIFFERENTIAL METHOD BLD: ABNORMAL
EGFRCR SERPLBLD CKD-EPI 2021: >60 ML/MIN/1.73M*2
EOSINOPHIL # BLD: 0.04 K/UL (ref 0.04–0.36)
EOSINOPHIL NFR BLD: 0.3 %
ERYTHROCYTE [DISTWIDTH] IN BLOOD BY AUTOMATED COUNT: 12.4 % (ref 11.7–14.4)
GLUCOSE SERPL-MCNC: 135 MG/DL (ref 70–99)
GLUCOSE UR STRIP.AUTO-MCNC: NEGATIVE MG/DL
HCT VFR BLD AUTO: 38 % (ref 35–45)
HGB BLD-MCNC: 13 G/DL (ref 11.8–15.7)
HGB UR QL STRIP.AUTO: NEGATIVE
IMM GRANULOCYTES # BLD AUTO: 0.04 K/UL (ref 0–0.08)
IMM GRANULOCYTES NFR BLD AUTO: 0.3 %
KETONES UR STRIP.AUTO-MCNC: NEGATIVE MG/DL
LEUKOCYTE ESTERASE UR QL STRIP.AUTO: NEGATIVE
LIPASE SERPL-CCNC: 53 U/L (ref 11–82)
LYMPHOCYTES # BLD: 1.6 K/UL (ref 1.2–3.5)
LYMPHOCYTES NFR BLD: 11.3 %
MCH RBC QN AUTO: 32.3 PG (ref 28–33.2)
MCHC RBC AUTO-ENTMCNC: 34.2 G/DL (ref 32.2–35.5)
MCV RBC AUTO: 94.3 FL (ref 83–98)
MONOCYTES # BLD: 0.71 K/UL (ref 0.28–0.8)
MONOCYTES NFR BLD: 5 %
NEUTROPHILS # BLD: 11.75 K/UL (ref 1.7–7)
NEUTS SEG NFR BLD: 82.8 %
NITRITE UR QL STRIP.AUTO: NEGATIVE
NRBC BLD-RTO: 0 %
PH UR STRIP.AUTO: 7.5 [PH]
PLATELET # BLD AUTO: 214 K/UL (ref 150–369)
PMV BLD AUTO: 11.4 FL (ref 9.4–12.3)
POTASSIUM SERPL-SCNC: 4 MEQ/L (ref 3.5–5.1)
PROT SERPL-MCNC: 6.9 G/DL (ref 6–8.2)
PROT UR QL STRIP.AUTO: NEGATIVE
RBC # BLD AUTO: 4.03 M/UL (ref 3.93–5.22)
SODIUM SERPL-SCNC: 138 MEQ/L (ref 136–145)
SP GR UR REFRACT.AUTO: 1.02
TROPONIN I SERPL HS-MCNC: 3.1 PG/ML
TROPONIN I SERPL HS-MCNC: 3.7 PG/ML
UROBILINOGEN UR STRIP-ACNC: 0.2 EU/DL
WBC # BLD AUTO: 14.18 K/UL (ref 3.8–10.5)

## 2025-04-30 PROCEDURE — 84484 ASSAY OF TROPONIN QUANT: CPT | Mod: 91 | Performed by: EMERGENCY MEDICINE

## 2025-04-30 PROCEDURE — 25800000 HC PHARMACY IV SOLUTIONS

## 2025-04-30 PROCEDURE — 96375 TX/PRO/DX INJ NEW DRUG ADDON: CPT | Mod: 59

## 2025-04-30 PROCEDURE — 80053 COMPREHEN METABOLIC PANEL: CPT | Performed by: EMERGENCY MEDICINE

## 2025-04-30 PROCEDURE — 63600105 HC IODINE BASED CONTRAST

## 2025-04-30 PROCEDURE — 81003 URINALYSIS AUTO W/O SCOPE: CPT

## 2025-04-30 PROCEDURE — 84484 ASSAY OF TROPONIN QUANT: CPT

## 2025-04-30 PROCEDURE — 96365 THER/PROPH/DIAG IV INF INIT: CPT | Mod: 59

## 2025-04-30 PROCEDURE — 83690 ASSAY OF LIPASE: CPT

## 2025-04-30 PROCEDURE — 85025 COMPLETE CBC W/AUTO DIFF WBC: CPT

## 2025-04-30 PROCEDURE — 85025 COMPLETE CBC W/AUTO DIFF WBC: CPT | Performed by: EMERGENCY MEDICINE

## 2025-04-30 PROCEDURE — 74177 CT ABD & PELVIS W/CONTRAST: CPT

## 2025-04-30 PROCEDURE — 96361 HYDRATE IV INFUSION ADD-ON: CPT

## 2025-04-30 PROCEDURE — 99285 EMERGENCY DEPT VISIT HI MDM: CPT | Mod: 25

## 2025-04-30 PROCEDURE — 84484 ASSAY OF TROPONIN QUANT: CPT | Performed by: EMERGENCY MEDICINE

## 2025-04-30 PROCEDURE — 93005 ELECTROCARDIOGRAM TRACING: CPT | Performed by: EMERGENCY MEDICINE

## 2025-04-30 PROCEDURE — 82040 ASSAY OF SERUM ALBUMIN: CPT

## 2025-04-30 PROCEDURE — G0378 HOSPITAL OBSERVATION PER HR: HCPCS

## 2025-04-30 PROCEDURE — 83690 ASSAY OF LIPASE: CPT | Performed by: EMERGENCY MEDICINE

## 2025-04-30 PROCEDURE — 36415 COLL VENOUS BLD VENIPUNCTURE: CPT

## 2025-04-30 PROCEDURE — 93005 ELECTROCARDIOGRAM TRACING: CPT

## 2025-04-30 PROCEDURE — 63600000 HC DRUGS/DETAIL CODE: Mod: JZ | Performed by: EMERGENCY MEDICINE

## 2025-04-30 PROCEDURE — 63600000 HC DRUGS/DETAIL CODE: Mod: JZ

## 2025-04-30 PROCEDURE — 12000000 HC ROOM AND CARE MED/SURG

## 2025-04-30 PROCEDURE — 80053 COMPREHEN METABOLIC PANEL: CPT

## 2025-04-30 PROCEDURE — 25800000 HC PHARMACY IV SOLUTIONS: Performed by: EMERGENCY MEDICINE

## 2025-04-30 RX ORDER — DEXTROSE 50 % IN WATER (D50W) INTRAVENOUS SYRINGE
25 AS NEEDED
Status: DISCONTINUED | OUTPATIENT
Start: 2025-04-30 | End: 2025-05-02 | Stop reason: HOSPADM

## 2025-04-30 RX ORDER — IBUPROFEN 200 MG
16-32 TABLET ORAL AS NEEDED
Status: DISCONTINUED | OUTPATIENT
Start: 2025-04-30 | End: 2025-05-02 | Stop reason: HOSPADM

## 2025-04-30 RX ORDER — TRAZODONE HYDROCHLORIDE 50 MG/1
50 TABLET ORAL NIGHTLY
COMMUNITY

## 2025-04-30 RX ORDER — FAMOTIDINE 10 MG/ML
20 INJECTION, SOLUTION INTRAVENOUS ONCE
Status: COMPLETED | OUTPATIENT
Start: 2025-04-30 | End: 2025-04-30

## 2025-04-30 RX ORDER — TRAZODONE HYDROCHLORIDE 50 MG/1
50 TABLET ORAL NIGHTLY
Status: DISCONTINUED | OUTPATIENT
Start: 2025-04-30 | End: 2025-05-02 | Stop reason: HOSPADM

## 2025-04-30 RX ORDER — METRONIDAZOLE 500 MG/100ML
500 INJECTION, SOLUTION INTRAVENOUS ONCE
Status: COMPLETED | OUTPATIENT
Start: 2025-04-30 | End: 2025-05-01

## 2025-04-30 RX ORDER — ALBUTEROL SULFATE 0.83 MG/ML
2.5 SOLUTION RESPIRATORY (INHALATION) EVERY 6 HOURS PRN
Status: DISCONTINUED | OUTPATIENT
Start: 2025-04-30 | End: 2025-05-02 | Stop reason: HOSPADM

## 2025-04-30 RX ORDER — METRONIDAZOLE 500 MG/100ML
500 INJECTION, SOLUTION INTRAVENOUS
Status: DISCONTINUED | OUTPATIENT
Start: 2025-05-01 | End: 2025-05-02 | Stop reason: HOSPADM

## 2025-04-30 RX ORDER — DEXTROSE 40 %
15-30 GEL (GRAM) ORAL AS NEEDED
Status: DISCONTINUED | OUTPATIENT
Start: 2025-04-30 | End: 2025-05-02 | Stop reason: HOSPADM

## 2025-04-30 RX ORDER — ONDANSETRON HYDROCHLORIDE 2 MG/ML
4 INJECTION, SOLUTION INTRAVENOUS ONCE
Status: COMPLETED | OUTPATIENT
Start: 2025-04-30 | End: 2025-04-30

## 2025-04-30 RX ORDER — ONDANSETRON HYDROCHLORIDE 2 MG/ML
4 INJECTION, SOLUTION INTRAVENOUS EVERY 8 HOURS PRN
Status: DISCONTINUED | OUTPATIENT
Start: 2025-04-30 | End: 2025-05-02 | Stop reason: HOSPADM

## 2025-04-30 RX ORDER — IOPAMIDOL 755 MG/ML
90 INJECTION, SOLUTION INTRAVASCULAR
Status: COMPLETED | OUTPATIENT
Start: 2025-04-30 | End: 2025-04-30

## 2025-04-30 RX ORDER — DEXTROSE MONOHYDRATE, SODIUM CHLORIDE, AND POTASSIUM CHLORIDE 50; 1.49; 4.5 G/1000ML; G/1000ML; G/1000ML
INJECTION, SOLUTION INTRAVENOUS CONTINUOUS
Status: ACTIVE | OUTPATIENT
Start: 2025-04-30 | End: 2025-05-02

## 2025-04-30 RX ORDER — SODIUM CHLORIDE 9 MG/ML
INJECTION, SOLUTION INTRAVENOUS CONTINUOUS
Status: DISCONTINUED | OUTPATIENT
Start: 2025-04-30 | End: 2025-05-01

## 2025-04-30 RX ORDER — ALBUTEROL SULFATE 90 UG/1
2 INHALANT RESPIRATORY (INHALATION) EVERY 6 HOURS PRN
COMMUNITY

## 2025-04-30 RX ORDER — SERTRALINE HYDROCHLORIDE 50 MG/1
75 TABLET, FILM COATED ORAL NIGHTLY
COMMUNITY

## 2025-04-30 RX ADMIN — CEFTRIAXONE SODIUM 1 G: 1 INJECTION, POWDER, FOR SOLUTION INTRAMUSCULAR; INTRAVENOUS at 22:20

## 2025-04-30 RX ADMIN — ONDANSETRON 4 MG: 2 INJECTION INTRAMUSCULAR; INTRAVENOUS at 19:33

## 2025-04-30 RX ADMIN — SODIUM CHLORIDE 1000 ML: 9 INJECTION, SOLUTION INTRAVENOUS at 19:32

## 2025-04-30 RX ADMIN — SODIUM CHLORIDE: 9 INJECTION, SOLUTION INTRAVENOUS at 22:20

## 2025-04-30 RX ADMIN — IOPAMIDOL 90 ML: 755 INJECTION, SOLUTION INTRAVENOUS at 21:34

## 2025-04-30 RX ADMIN — FAMOTIDINE 20 MG: 10 INJECTION INTRAVENOUS at 19:33

## 2025-04-30 RX ADMIN — METRONIDAZOLE 500 MG: 500 INJECTION, SOLUTION INTRAVENOUS at 23:02

## 2025-04-30 ASSESSMENT — ENCOUNTER SYMPTOMS
HEADACHES: 0
VOMITING: 0
WEAKNESS: 0
HEMATURIA: 0
SHORTNESS OF BREATH: 0
FEVER: 0
PALPITATIONS: 0
ABDOMINAL PAIN: 1
LIGHT-HEADEDNESS: 1
BACK PAIN: 1
DIZZINESS: 0
CHILLS: 0
NECK PAIN: 0
NAUSEA: 1
CHEST TIGHTNESS: 0
DIARRHEA: 0
DYSURIA: 0
BLOOD IN STOOL: 0
CONSTIPATION: 0

## 2025-04-30 NOTE — ED PROVIDER NOTES
Abdominal Pain  Associated symptoms: nausea    Associated symptoms: no chest pain, no chills, no constipation, no diarrhea, no dysuria, no fever, no hematuria, no shortness of breath and no vomiting        Chief Complaint   Patient presents with   • Abdominal Pain        HPI   Patient is a 49-year-old female with a past medical history of hyperlipidemia presenting to the emergency department for evaluation of epigastric abdominal pain.  Patient states that she had a normal morning but approximately 5 hours ago she developed a sudden onset of epigastric pain after playing tennis and eating lunch earlier in the day normally.  Patient states she had a normal soft bowel movement shortly following the onset of this pain without relief.  Her pain has been constant since onset.  Patient endorses associated nausea without vomiting and shaking chills.  No medication taken prior to arrival such as antinausea, pain medication. Patient denies associated fever, vomiting, diarrhea.  Patient states while on her way to the emergency department she began to notice some midline back pain.  Patient denies twisting, injury to her back while getting in the car.  Patient denies history of hypertension, coronary artery disease, aneurysm.  Patient is on Wegovy for management of her hyperlipidemia, patient states she has been on this for 1.5 years without complication.  Patient states her last dose increase was 3 months ago.  Patient initially denying lightheadedness but later on and conversation endorsing lightheadedness.  Denies headache, dizziness, visual changes.  Last menstrual period 3 years ago.      Past Medical History:   Diagnosis Date   • Asthma    • Unicornate uterus          Past Surgical History   Procedure Laterality Date   •  section      x 2   • D&c first trimester / tx incomplete / missed / septic / induced      • Foot fracture surgery     • Postpartum tubal ligation         Family History   Problem  "Relation Name Age of Onset   • Hypertension Biological Father     • Osteoporosis Biological Mother     • Thyroid disease Biological Mother     • Uterine cancer Biological Mother     • Parkinsonism Paternal Grandmother     • Parkinsonism Maternal Grandmother     • Depression Biological Sister     • Thyroid disease Biological Sister     • Hypertension Biological Sister     • No Known Problems Paternal Grandfather     • Osteoporosis Mother's Sister         Social History     Tobacco Use   • Smoking status: Never   • Smokeless tobacco: Never   Vaping Use   • Vaping status: Never Used   Substance Use Topics   • Alcohol use: Yes   • Drug use: No       Systems Reviewed from Nursing Triage:               Review of Systems   Constitutional:  Negative for chills and fever.   Eyes:  Negative for visual disturbance.   Respiratory:  Negative for chest tightness and shortness of breath.    Cardiovascular:  Negative for chest pain, palpitations and leg swelling.   Gastrointestinal:  Positive for abdominal pain and nausea. Negative for blood in stool, constipation, diarrhea and vomiting.   Genitourinary:  Negative for dysuria and hematuria.   Musculoskeletal:  Positive for back pain. Negative for neck pain.   Neurological:  Positive for light-headedness. Negative for dizziness, weakness and headaches.            ED Triage Vitals [04/30/25 1742]   Temp Heart Rate Resp BP SpO2   36.2 °C (97.1 °F) 88 12 (!) 120/56 96 %      Temp Source Heart Rate Source Patient Position BP Location FiO2 (%) (Set)   Temporal -- Sitting Right upper arm --       Vitals:    04/30/25 1742   BP: (!) 120/56   BP Location: Right upper arm   Patient Position: Sitting   Pulse: 88   Resp: 12   Temp: 36.2 °C (97.1 °F)   TempSrc: Temporal   SpO2: 96%   Weight: 62.1 kg (137 lb)   Height: 1.6 m (5' 3\")       Pulse Ox %: 96 % (04/30/25 1917)  Pulse Ox Interpretation: Normal (04/30/25 1917)  Heart Rate: 88 (04/30/25 1917)  Rhythm Strip Interpretation: Normal Sinus " Rhythm (04/30/25 1917)        Physical Exam  Vitals and nursing note reviewed.   Constitutional:       General: She is not in acute distress.     Appearance: Normal appearance. She is not toxic-appearing.   HENT:      Head: Normocephalic and atraumatic.   Eyes:      Conjunctiva/sclera: Conjunctivae normal.   Cardiovascular:      Rate and Rhythm: Normal rate and regular rhythm.      Heart sounds: Normal heart sounds.   Pulmonary:      Effort: Pulmonary effort is normal. No respiratory distress.      Breath sounds: Normal breath sounds and air entry.   Abdominal:      General: Abdomen is flat.      Palpations: Abdomen is soft.      Tenderness: There is abdominal tenderness in the right upper quadrant, right lower quadrant and epigastric area. There is no right CVA tenderness, left CVA tenderness, guarding or rebound. Positive signs include Burnett's sign and McBurney's sign. Negative signs include psoas sign and obturator sign.   Musculoskeletal:      Cervical back: No pain with movement, spinous process tenderness or muscular tenderness. Normal range of motion.   Skin:     General: Skin is warm and dry.      Findings: No rash.   Neurological:      Mental Status: She is alert. Mental status is at baseline.      Gait: Gait is intact.   Psychiatric:         Speech: Speech normal.         Behavior: Behavior normal. Behavior is cooperative.         Cognition and Memory: Cognition normal.              ECG 12 lead    (Results Pending)   CT ABDOMEN PELVIS WITH IV CONTRAST    (Results Pending)       Labs Reviewed   CBC AND DIFF - Abnormal       Result Value    WBC 14.18 (*)     RBC 4.03      Hemoglobin 13.0      Hematocrit 38.0      MCV 94.3      MCH 32.3      MCHC 34.2      RDW 12.4      Platelets 214      MPV 11.4      Differential Type Auto      nRBC 0.0      Immature Granulocytes 0.3      Neutrophils 82.8      Lymphocytes 11.3      Monocytes 5.0      Eosinophils 0.3      Basophils 0.3      Immature Granulocytes, Absolute  0.04      Neutrophils, Absolute 11.75 (*)     Lymphocytes, Absolute 1.60      Monocytes, Absolute 0.71      Eosinophils, Absolute 0.04      Basophils, Absolute 0.04     COMPREHENSIVE METABOLIC PANEL - Abnormal    Sodium 138      Potassium 4.0      Chloride 104      CO2 25      BUN 13      Creatinine 0.8      Glucose 135 (*)     Calcium 10.0      AST (SGOT) 16      ALT (SGPT) 14      Alkaline Phosphatase 53      Total Protein 6.9      Albumin 4.2      Bilirubin, Total 0.5      eGFR >60.0      Anion Gap 9     HIGH SENSITIVE TROPONIN I (BASELINE - REFLEX 2HR) - Normal    High Sens Troponin I 3.7     LIPASE - Normal    Lipase 53     RAINBOW DRAW PANEL    Narrative:     The following orders were created for panel order [place] Draw Panel.  Procedure                               Abnormality         Status                     ---------                               -----------         ------                     RAINBOW LT BLUE[[id number]]                                  In process                   Please view results for these tests on the individual orders.   HIGH SENSITIVE TROPONIN I (NO REFLEX)   RAINBOW LT BLUE       ECG 12 lead    (Results Pending)   CT ABDOMEN PELVIS WITH IV CONTRAST    (Results Pending)         Procedures    Final diagnoses:   None       ED Course & MDM     ED Course as of 04/30/25 2233 Wed Apr 30, 2025 1956 UA w/ reflex culture (ED Only)  No UTI [MJ]   1956 WBC(!): 14.18  CT A/P ordered [MJ]   1956 Lipase: 53 [MJ]   1956 Comprehensive metabolic panel(!)  Normal liver enzymes, no JACK [MJ]   2042 Patient states she is feeling better after receiving medication and IV fluids, awaiting CT scan. Signed out to Dr. Caba [MJ]   2205 General surgery paged [SS]   2232 I spoke with Dr. Gonzalez who accepted the patient under her service.  I spoke with Minoo,the surgical PA who will come see the patient and believe the admission [SS]      ED Course User Index  [MJ] Jody Umaña, PA C  [SS] Latasha Gil  MD AGUEDA           Medical Decision Making  Amount and/or Complexity of Data Reviewed  Labs: ordered.  Radiology: ordered.  ECG/medicine tests: ordered.    Risk  Prescription drug management.        7:17 PM      Impression/Medical Decision Making: Abdominal pain    Plan: Labs, urine, CT, fluids/Zofran    Vital Signs Review: Vital signs have been reviewed. The oxygen saturation is  SpO2: 96 % which is normal.      REJ[facility] Solis  4/30/2025      We are in a period of time with increased volumes and decreased capacity.     This document was created using dragon dictation software.  There might be some typographical errors due to this technology.       Jody Umaña PA C  04/30/25 2042

## 2025-05-01 ENCOUNTER — ANESTHESIA EVENT (INPATIENT)
Dept: OPERATING ROOM | Facility: HOSPITAL | Age: 50
End: 2025-05-01
Payer: COMMERCIAL

## 2025-05-01 ENCOUNTER — ANESTHESIA (INPATIENT)
Dept: OPERATING ROOM | Facility: HOSPITAL | Age: 50
End: 2025-05-01
Payer: COMMERCIAL

## 2025-05-01 PROBLEM — K35.80 ACUTE APPENDICITIS: Status: ACTIVE | Noted: 2025-04-30

## 2025-05-01 LAB
ATRIAL RATE: 80
HCG UR QL: NEGATIVE
P AXIS: 56
PR INTERVAL: 168
QRS DURATION: 68
QT INTERVAL: 378
QTC CALCULATION(BAZETT): 435
R AXIS: 70
T WAVE AXIS: 59
VENTRICULAR RATE: 80

## 2025-05-01 PROCEDURE — 84703 CHORIONIC GONADOTROPIN ASSAY: CPT | Performed by: PHYSICIAN ASSISTANT

## 2025-05-01 PROCEDURE — 12000000 HC ROOM AND CARE MED/SURG

## 2025-05-01 PROCEDURE — G0378 HOSPITAL OBSERVATION PER HR: HCPCS

## 2025-05-01 PROCEDURE — 36000014 HC OR LEVEL 4 EA ADDL MIN: Performed by: SURGERY

## 2025-05-01 PROCEDURE — 63600000 HC DRUGS/DETAIL CODE: Mod: JZ | Performed by: NURSE ANESTHETIST, CERTIFIED REGISTERED

## 2025-05-01 PROCEDURE — 25800000 HC PHARMACY IV SOLUTIONS: Performed by: SURGERY

## 2025-05-01 PROCEDURE — 63600000 HC DRUGS/DETAIL CODE: Mod: JZ | Performed by: PHYSICIAN ASSISTANT

## 2025-05-01 PROCEDURE — 44970 LAPAROSCOPY APPENDECTOMY: CPT | Performed by: SURGERY

## 2025-05-01 PROCEDURE — 36415 COLL VENOUS BLD VENIPUNCTURE: CPT | Performed by: PHYSICIAN ASSISTANT

## 2025-05-01 PROCEDURE — 25800000 HC PHARMACY IV SOLUTIONS: Performed by: PHYSICIAN ASSISTANT

## 2025-05-01 PROCEDURE — 63700000 HC SELF-ADMINISTRABLE DRUG: Performed by: PHYSICIAN ASSISTANT

## 2025-05-01 PROCEDURE — 0DTJ4ZZ RESECTION OF APPENDIX, PERCUTANEOUS ENDOSCOPIC APPROACH: ICD-10-PCS | Performed by: SURGERY

## 2025-05-01 PROCEDURE — 63700000 HC SELF-ADMINISTRABLE DRUG: Performed by: SURGERY

## 2025-05-01 PROCEDURE — 27200000 HC STERILE SUPPLY: Performed by: SURGERY

## 2025-05-01 PROCEDURE — 63600000 HC DRUGS/DETAIL CODE: Mod: JZ | Performed by: ANESTHESIOLOGY

## 2025-05-01 PROCEDURE — 25000000 HC PHARMACY GENERAL: Performed by: NURSE ANESTHETIST, CERTIFIED REGISTERED

## 2025-05-01 PROCEDURE — 88304 TISSUE EXAM BY PATHOLOGIST: CPT | Performed by: SURGERY

## 2025-05-01 PROCEDURE — 63600000 HC DRUGS/DETAIL CODE: Mod: JZ | Performed by: SURGERY

## 2025-05-01 PROCEDURE — 36000004 HC OR LEVEL 4 INITIAL 30MIN: Performed by: SURGERY

## 2025-05-01 PROCEDURE — 99222 1ST HOSP IP/OBS MODERATE 55: CPT | Mod: 57 | Performed by: SURGERY

## 2025-05-01 PROCEDURE — 71000001 HC PACU PHASE 1 INITIAL 30MIN: Performed by: SURGERY

## 2025-05-01 PROCEDURE — 37000001 HC ANESTHESIA GENERAL: Performed by: SURGERY

## 2025-05-01 PROCEDURE — 71000011 HC PACU PHASE 1 EA ADDL MIN: Performed by: SURGERY

## 2025-05-01 PROCEDURE — 25000000 HC PHARMACY GENERAL: Performed by: SURGERY

## 2025-05-01 PROCEDURE — 25800000 HC PHARMACY IV SOLUTIONS: Performed by: NURSE ANESTHETIST, CERTIFIED REGISTERED

## 2025-05-01 RX ORDER — BUPIVACAINE HYDROCHLORIDE AND EPINEPHRINE 5; 5 MG/ML; UG/ML
INJECTION, SOLUTION EPIDURAL; INTRACAUDAL; PERINEURAL
Status: DISCONTINUED | OUTPATIENT
Start: 2025-05-01 | End: 2025-05-01 | Stop reason: HOSPADM

## 2025-05-01 RX ORDER — DEXAMETHASONE SODIUM PHOSPHATE 4 MG/ML
INJECTION, SOLUTION INTRA-ARTICULAR; INTRALESIONAL; INTRAMUSCULAR; INTRAVENOUS; SOFT TISSUE AS NEEDED
Status: DISCONTINUED | OUTPATIENT
Start: 2025-05-01 | End: 2025-05-01 | Stop reason: SURG

## 2025-05-01 RX ORDER — PROPOFOL 10 MG/ML
INJECTION, EMULSION INTRAVENOUS AS NEEDED
Status: DISCONTINUED | OUTPATIENT
Start: 2025-05-01 | End: 2025-05-01 | Stop reason: SURG

## 2025-05-01 RX ORDER — LIDOCAINE HYDROCHLORIDE 10 MG/ML
INJECTION, SOLUTION INFILTRATION; PERINEURAL AS NEEDED
Status: DISCONTINUED | OUTPATIENT
Start: 2025-05-01 | End: 2025-05-01 | Stop reason: SURG

## 2025-05-01 RX ORDER — DIPHENHYDRAMINE HCL 50 MG/ML
12.5 VIAL (ML) INJECTION
Status: DISCONTINUED | OUTPATIENT
Start: 2025-05-01 | End: 2025-05-01 | Stop reason: HOSPADM

## 2025-05-01 RX ORDER — PHENYLEPHRINE HYDROCHLORIDE 10 MG/ML
INJECTION INTRAVENOUS AS NEEDED
Status: DISCONTINUED | OUTPATIENT
Start: 2025-05-01 | End: 2025-05-01 | Stop reason: SURG

## 2025-05-01 RX ORDER — SODIUM CHLORIDE 9 MG/ML
INJECTION, SOLUTION INTRAVENOUS CONTINUOUS PRN
Status: DISCONTINUED | OUTPATIENT
Start: 2025-05-01 | End: 2025-05-01 | Stop reason: SURG

## 2025-05-01 RX ORDER — FENTANYL CITRATE 50 UG/ML
50 INJECTION, SOLUTION INTRAMUSCULAR; INTRAVENOUS EVERY 5 MIN PRN
Status: COMPLETED | OUTPATIENT
Start: 2025-05-01 | End: 2025-05-01

## 2025-05-01 RX ORDER — DEXTROSE 40 %
15-30 GEL (GRAM) ORAL AS NEEDED
Status: DISCONTINUED | OUTPATIENT
Start: 2025-05-01 | End: 2025-05-01 | Stop reason: HOSPADM

## 2025-05-01 RX ORDER — ROCURONIUM BROMIDE 10 MG/ML
INJECTION, SOLUTION INTRAVENOUS AS NEEDED
Status: DISCONTINUED | OUTPATIENT
Start: 2025-05-01 | End: 2025-05-01 | Stop reason: SURG

## 2025-05-01 RX ORDER — MIDAZOLAM HYDROCHLORIDE 2 MG/2ML
INJECTION, SOLUTION INTRAMUSCULAR; INTRAVENOUS AS NEEDED
Status: DISCONTINUED | OUTPATIENT
Start: 2025-05-01 | End: 2025-05-01 | Stop reason: SURG

## 2025-05-01 RX ORDER — IBUPROFEN 200 MG
16-32 TABLET ORAL AS NEEDED
Status: DISCONTINUED | OUTPATIENT
Start: 2025-05-01 | End: 2025-05-01 | Stop reason: HOSPADM

## 2025-05-01 RX ORDER — KETOROLAC TROMETHAMINE 30 MG/ML
INJECTION, SOLUTION INTRAMUSCULAR; INTRAVENOUS AS NEEDED
Status: DISCONTINUED | OUTPATIENT
Start: 2025-05-01 | End: 2025-05-01 | Stop reason: SURG

## 2025-05-01 RX ORDER — MEPERIDINE HYDROCHLORIDE 25 MG/ML
12.5 INJECTION INTRAMUSCULAR; INTRAVENOUS; SUBCUTANEOUS EVERY 10 MIN PRN
Status: DISCONTINUED | OUTPATIENT
Start: 2025-05-01 | End: 2025-05-01 | Stop reason: HOSPADM

## 2025-05-01 RX ORDER — ONDANSETRON HYDROCHLORIDE 2 MG/ML
4 INJECTION, SOLUTION INTRAVENOUS
Status: DISCONTINUED | OUTPATIENT
Start: 2025-05-01 | End: 2025-05-01 | Stop reason: HOSPADM

## 2025-05-01 RX ORDER — FENTANYL CITRATE 50 UG/ML
INJECTION, SOLUTION INTRAMUSCULAR; INTRAVENOUS AS NEEDED
Status: DISCONTINUED | OUTPATIENT
Start: 2025-05-01 | End: 2025-05-01 | Stop reason: SURG

## 2025-05-01 RX ORDER — ONDANSETRON HYDROCHLORIDE 2 MG/ML
INJECTION, SOLUTION INTRAVENOUS AS NEEDED
Status: DISCONTINUED | OUTPATIENT
Start: 2025-05-01 | End: 2025-05-01 | Stop reason: SURG

## 2025-05-01 RX ORDER — HYDROMORPHONE HYDROCHLORIDE 1 MG/ML
0.5 INJECTION, SOLUTION INTRAMUSCULAR; INTRAVENOUS; SUBCUTANEOUS
Status: DISCONTINUED | OUTPATIENT
Start: 2025-05-01 | End: 2025-05-01 | Stop reason: HOSPADM

## 2025-05-01 RX ORDER — ACETAMINOPHEN 325 MG/1
650 TABLET ORAL EVERY 6 HOURS PRN
Status: DISCONTINUED | OUTPATIENT
Start: 2025-05-01 | End: 2025-05-02 | Stop reason: HOSPADM

## 2025-05-01 RX ORDER — DEXTROSE 50 % IN WATER (D50W) INTRAVENOUS SYRINGE
25 AS NEEDED
Status: DISCONTINUED | OUTPATIENT
Start: 2025-05-01 | End: 2025-05-01 | Stop reason: HOSPADM

## 2025-05-01 RX ADMIN — ACETAMINOPHEN 650 MG: 325 TABLET ORAL at 00:47

## 2025-05-01 RX ADMIN — ACETAMINOPHEN 650 MG: 325 TABLET ORAL at 17:55

## 2025-05-01 RX ADMIN — HYDROMORPHONE HYDROCHLORIDE 0.5 MG: 0.5 INJECTION, SOLUTION INTRAMUSCULAR; INTRAVENOUS; SUBCUTANEOUS at 15:17

## 2025-05-01 RX ADMIN — SUGAMMADEX 130 MG: 100 INJECTION, SOLUTION INTRAVENOUS at 10:57

## 2025-05-01 RX ADMIN — SODIUM CHLORIDE: 9 INJECTION, SOLUTION INTRAVENOUS at 09:53

## 2025-05-01 RX ADMIN — TRAZODONE HYDROCHLORIDE 50 MG: 50 TABLET ORAL at 00:47

## 2025-05-01 RX ADMIN — FENTANYL CITRATE 25 MCG: 50 INJECTION, SOLUTION INTRAMUSCULAR; INTRAVENOUS at 10:31

## 2025-05-01 RX ADMIN — FENTANYL CITRATE 25 MCG: 50 INJECTION, SOLUTION INTRAMUSCULAR; INTRAVENOUS at 11:02

## 2025-05-01 RX ADMIN — HYDROMORPHONE HYDROCHLORIDE 0.2 MG: 0.2 INJECTION, SOLUTION INTRAMUSCULAR; INTRAVENOUS; SUBCUTANEOUS at 21:51

## 2025-05-01 RX ADMIN — FENTANYL CITRATE 50 MCG: 50 INJECTION INTRAMUSCULAR; INTRAVENOUS at 13:03

## 2025-05-01 RX ADMIN — CEFTRIAXONE SODIUM 1 G: 1 INJECTION, POWDER, FOR SOLUTION INTRAMUSCULAR; INTRAVENOUS at 21:33

## 2025-05-01 RX ADMIN — FENTANYL CITRATE 25 MCG: 50 INJECTION, SOLUTION INTRAMUSCULAR; INTRAVENOUS at 11:00

## 2025-05-01 RX ADMIN — METRONIDAZOLE 500 MG: 500 INJECTION, SOLUTION INTRAVENOUS at 16:51

## 2025-05-01 RX ADMIN — TRAZODONE HYDROCHLORIDE 50 MG: 50 TABLET ORAL at 21:32

## 2025-05-01 RX ADMIN — MIDAZOLAM 2 MG: 1 INJECTION INTRAMUSCULAR; INTRAVENOUS at 09:52

## 2025-05-01 RX ADMIN — ONDANSETRON 4 MG: 2 INJECTION INTRAMUSCULAR; INTRAVENOUS at 10:11

## 2025-05-01 RX ADMIN — ONDANSETRON 4 MG: 2 INJECTION INTRAMUSCULAR; INTRAVENOUS at 12:58

## 2025-05-01 RX ADMIN — FENTANYL CITRATE 50 MCG: 50 INJECTION, SOLUTION INTRAMUSCULAR; INTRAVENOUS at 11:22

## 2025-05-01 RX ADMIN — LIDOCAINE HYDROCHLORIDE 10 ML: 10 INJECTION, SOLUTION INFILTRATION; PERINEURAL at 10:00

## 2025-05-01 RX ADMIN — PROPOFOL 180 MG: 10 INJECTION, EMULSION INTRAVENOUS at 10:00

## 2025-05-01 RX ADMIN — FENTANYL CITRATE 50 MCG: 50 INJECTION, SOLUTION INTRAMUSCULAR; INTRAVENOUS at 10:00

## 2025-05-01 RX ADMIN — ROCURONIUM BROMIDE 50 MG: 10 INJECTION INTRAVENOUS at 10:00

## 2025-05-01 RX ADMIN — SERTRALINE HYDROCHLORIDE 75 MG: 100 TABLET ORAL at 00:47

## 2025-05-01 RX ADMIN — FENTANYL CITRATE 50 MCG: 50 INJECTION INTRAMUSCULAR; INTRAVENOUS at 13:08

## 2025-05-01 RX ADMIN — POTASSIUM CHLORIDE, DEXTROSE MONOHYDRATE AND SODIUM CHLORIDE: 150; 5; 450 INJECTION, SOLUTION INTRAVENOUS at 00:46

## 2025-05-01 RX ADMIN — METRONIDAZOLE 500 MG: 500 INJECTION, SOLUTION INTRAVENOUS at 06:20

## 2025-05-01 RX ADMIN — KETOROLAC TROMETHAMINE 15 MG: 30 INJECTION, SOLUTION INTRAMUSCULAR; INTRAVENOUS at 10:32

## 2025-05-01 RX ADMIN — DEXAMETHASONE SODIUM PHOSPHATE 4 MG: 4 INJECTION, SOLUTION INTRA-ARTICULAR; INTRALESIONAL; INTRAMUSCULAR; INTRAVENOUS; SOFT TISSUE at 10:00

## 2025-05-01 RX ADMIN — DEXAMETHASONE SODIUM PHOSPHATE 4 MG: 4 INJECTION, SOLUTION INTRA-ARTICULAR; INTRALESIONAL; INTRAMUSCULAR; INTRAVENOUS; SOFT TISSUE at 10:11

## 2025-05-01 RX ADMIN — PHENYLEPHRINE HYDROCHLORIDE 300 MCG: 10 INJECTION INTRAVENOUS at 10:10

## 2025-05-01 RX ADMIN — FENTANYL CITRATE 25 MCG: 50 INJECTION, SOLUTION INTRAMUSCULAR; INTRAVENOUS at 10:18

## 2025-05-01 RX ADMIN — SERTRALINE HYDROCHLORIDE 75 MG: 100 TABLET ORAL at 21:31

## 2025-05-01 RX ADMIN — METRONIDAZOLE 500 MG: 500 INJECTION, SOLUTION INTRAVENOUS at 23:55

## 2025-05-01 ASSESSMENT — COGNITIVE AND FUNCTIONAL STATUS - GENERAL
CLIMB 3 TO 5 STEPS WITH RAILING: 4 - NONE
STANDING UP FROM CHAIR USING ARMS: 4 - NONE
MOVING TO AND FROM BED TO CHAIR: 4 - NONE
CLIMB 3 TO 5 STEPS WITH RAILING: 3 - A LITTLE
MOVING TO AND FROM BED TO CHAIR: 4 - NONE
DO YOU HAVE SERIOUS DIFFICULTY WALKING OR CLIMBING STAIRS: NO
HELP NEEDED FOR PERSONAL GROOMING: 4 - NONE
DRESSING REGULAR LOWER BODY CLOTHING: 4 - NONE
WALKING IN HOSPITAL ROOM: 4 - NONE
WALKING IN HOSPITAL ROOM: 4 - NONE
MOVING TO AND FROM BED TO CHAIR: 4 - NONE
STANDING UP FROM CHAIR USING ARMS: 4 - NONE
HELP NEEDED FOR BATHING: 4 - NONE
STANDING UP FROM CHAIR USING ARMS: 4 - NONE
CLIMB 3 TO 5 STEPS WITH RAILING: 3 - A LITTLE
DRESSING REGULAR UPPER BODY CLOTHING: 4 - NONE
EATING MEALS: 4 - NONE
WALKING IN HOSPITAL ROOM: 4 - NONE
MOVING TO AND FROM BED TO CHAIR: 4 - NONE
MOVING TO AND FROM BED TO CHAIR: 4 - NONE
STANDING UP FROM CHAIR USING ARMS: 3 - A LITTLE
WALKING IN HOSPITAL ROOM: 3 - A LITTLE
CLIMB 3 TO 5 STEPS WITH RAILING: 4 - NONE
TOILETING: 4 - NONE
STANDING UP FROM CHAIR USING ARMS: 3 - A LITTLE
CLIMB 3 TO 5 STEPS WITH RAILING: 4 - NONE
WALKING IN HOSPITAL ROOM: 3 - A LITTLE

## 2025-05-01 NOTE — NURSING NOTE
Educated on pain regimen. OOB to bathroom, voided, dizzy with ambulating that resolved with laying. Educated on clear liquid diet. Tolerating gingerale. Encouraging oral hydration. Goal to ambulate in hallway tonight.

## 2025-05-01 NOTE — PROGRESS NOTES
"Aidee Damico   [id number]    Your doctor has referred you for a   that requires the injection of an iodinated contrast material into your bloodstream. This iodinated contrast material, sometimes referred to as \"x-ray dye\" allows for better interpretation of the x-ray films or CT images and results in a more accurate interpretation of the examination.     Without the use of iodinated contrast (x-ray dye), the examination may be less informative and result in a suboptimal examination, and possibly a delay in diagnosis and, if needed, treatment.     The iodinated contrast material is given through a small needle or catheter placed into a vein, usually on the inside of the elbow, on the back of hand, or in a vein in the foot or lower leg.    The most common, though still rare, potential reaction to an intravenous contrast injection is an allergic-like reaction. Most allergic-like reactions are mild, though a small subset of people can have more severe reactions. Mild reactions include mild / scattered hives, itching, scratchy throat, sneezing and nasal congestion. More severe reactions include facial swelling, severe difficulty breathing, wheezing and anaphylactic shock. Those with a prior history allergic-like reaction to the same class of contrast media (such as CT contrast or MRI contrast) are at the highest risk for an allergic reaction.     If you believe you had an allergic reaction to contrast in the past, please let our staff know. We can determine if this increases your risk for a future reaction and provide steps to decrease the risk. This may delay your examination, but it decreases the risk of having a new and possibly more severe reaction to the contrast injection.    People with a history of prior allergic reactions to other substances (such as unrelated medications and food) and patients with a history of asthma have slightly increased risk for an allergic reaction from contrast material when compared " with the general population, but do not require to be pretreated prior to a contrast injection.    You should notify the physician, nurse or technologist if you have ever had any of these conditions or if you have any questions.

## 2025-05-01 NOTE — H&P
General Surgery Admission H & P    Subjective     Aidee Damico is a 49 y.o. female with a past medical history including asthma and surgical history of , who developed new onset lower abdominal pain associated with chills and vomiting starting yesterday.  In the emergency department, CT scan showed acute appendicitis.    Medical History:   Past Medical History:   Diagnosis Date    Asthma     Unicornate uterus        Surgical History:   Past Surgical History   Procedure Laterality Date     section      x 2    D&c first trimester / tx incomplete / missed / septic / induced       Foot fracture surgery      Postpartum tubal ligation         Social History:   Social History     Social History Narrative    Not on file       Family History:   Family History   Problem Relation Name Age of Onset    Hypertension Biological Father      Osteoporosis Biological Mother      Thyroid disease Biological Mother      Uterine cancer Biological Mother      Parkinsonism Paternal Grandmother      Parkinsonism Maternal Grandmother      Depression Biological Sister      Thyroid disease Biological Sister      Hypertension Biological Sister      No Known Problems Paternal Grandfather      Osteoporosis Mother's Sister         Allergies: Ciprofloxacin and Gatifloxacin    Home Medications:   acetaminophen (TYLENOL) tablet 650 mg  650 mg oral q6h PRN    albuterol nebulizer solution 2.5 mg  2.5 mg nebulization q6h PRN    cefTRIAXone (ROCEPHIN) IVPB 1 g in 100 mL NSS vial in bag  1 g intravenous q24h INT    glucose chewable tablet 16-32 g of dextrose  16-32 g of dextrose oral PRN    Or    dextrose 40 % oral gel 15-30 g of dextrose  15-30 g of dextrose oral PRN    Or    glucagon (GLUCAGEN) injection 1 mg  1 mg intramuscular PRN    Or    dextrose 50 % in water (D50) injection 12.5 g  25 mL intravenous PRN    dextrose 5 % and sodium chloride 0.45 % with KCl 20 mEq/L infusion   intravenous Continuous    HYDROmorphone (DILAUDID)  0.2 mg/mL injection 0.2 mg  0.2 mg intravenous q3h PRN    metroNIDAZOLE in NaCl (iso-os) (FLAGYL) IVPB 500 mg  500 mg intravenous q8h INT    ondansetron (ZOFRAN) injection 4 mg  4 mg intravenous q8h PRN    sertraline (ZOLOFT) tablet 75 mg  75 mg oral Nightly    traZODone (DESYREL) tablet 50 mg  50 mg oral Nightly       Current Medications:  Current Facility-Administered Medications   Medication Dose Route Frequency Provider Last Rate Last Admin    acetaminophen (TYLENOL) tablet 650 mg  650 mg oral q6h PRN Franky Robles PA C   650 mg at 05/01/25 0047    albuterol nebulizer solution 2.5 mg  2.5 mg nebulization q6h PRN Franky Robles PA C        cefTRIAXone (ROCEPHIN) IVPB 1 g in 100 mL NSS vial in bag  1 g intravenous q24h INT Franky Robles PA C        glucose chewable tablet 16-32 g of dextrose  16-32 g of dextrose oral PRN Franky Robles PA C        Or    dextrose 40 % oral gel 15-30 g of dextrose  15-30 g of dextrose oral PRN Franky Robles PA C        Or    glucagon (GLUCAGEN) injection 1 mg  1 mg intramuscular PRN Franky Robles PA C        Or    dextrose 50 % in water (D50) injection 12.5 g  25 mL intravenous PRN Franky Robles PA C        dextrose 5 % and sodium chloride 0.45 % with KCl 20 mEq/L infusion   intravenous Continuous Franky Robles PA C 80 mL/hr at 05/01/25 0046 New Bag at 05/01/25 0046    HYDROmorphone (DILAUDID) 0.2 mg/mL injection 0.2 mg  0.2 mg intravenous q3h PRN Franky Robles PA C        metroNIDAZOLE in NaCl (iso-os) (FLAGYL) IVPB 500 mg  500 mg intravenous q8h INT Franky Robles PA C   Stopped at 05/01/25 0734    ondansetron (ZOFRAN) injection 4 mg  4 mg intravenous q8h PRN Franky Robles PA C        sertraline (ZOLOFT) tablet 75 mg  75 mg oral Nightly Heagey, Franky E, PA C   75 mg at 05/01/25 0047    traZODone (DESYREL) tablet 50 mg  50 mg oral Nightly Franky Robles PA C   50 mg at 05/01/25 0047       Review of Systems  Pertinent items  "are noted in HPI.    Objective     Physicial Exam  Visit Vitals  /61 (BP Location: Left upper arm, Patient Position: Lying)   Pulse 83   Temp 37.9 °C (100.2 °F)   Resp 15   Ht 1.6 m (5' 3\")   Wt 66 kg (145 lb 8.1 oz)   LMP  (LMP Unknown)   SpO2 96%   BMI 25.77 kg/m²       General appearance: alert, appears stated age and cooperative  Head: normocephalic, without obvious abnormality, atraumatic  Eyes: conjunctivae/corneas clear. PERRL, EOM's grossly intact. Sclerae nonicteric.  Neck: trachea midline   Lungs: Respirations nonlabored.  No tachypnea, cough, use of accessory respiratory muscles.  Abdomen: Soft, tender to palpation right lower quadrant.  Rectal: deferred  Extremities: extremities normal, warm and well-perfused; no cyanosis, clubbing, or edema  Skin: Skin color, texture, turgor normal. No rashes or lesions  Neurologic: Grossly normal    Labs  CBC Results         04/30/25 04/27/21 04/09/15     1750 0901 1732    WBC 14.18 5.29 8.99    RBC 4.03 4.29 2.53    HGB 13.0 13.7 8.0    HCT 38.0 42.1 24.6    MCV 94.3 98.1 97.2    MCH 32.3 31.9 31.6    MCHC 34.2 32.5 32.5     204 128          CMP Results         04/30/25 04/27/21     1750 0901     139    K 4.0 4.5    Cl 104 104    CO2 25 28    Glucose 135 83    BUN 13 13    Creatinine 0.8 0.8    Calcium 10.0 9.6    Anion Gap 9 7    AST 16 20    ALT 14 15    Albumin 4.2 4.1    EGFR >60.0 >60.0           Comment for K at 1750 on 04/30/25: Results obtained on plasma. Plasma Potassium values may be up to 0.4 mEQ/L less than serum values. The differences may be greater for patients with high platelet or white cell counts.    Comment for EGFR at 1750 on 04/30/25: Calculation based on the Chronic Kidney Disease Epidemiology Collaboration (CKD-EPI) equation refit without adjustment for race.              Imaging  IMPRESSION:     1.  Acute appendicitis with reactive inflammation involving adjacent loops of   small bowel in the pelvis.   2.  Small free fluid " in the pelvis.  No evidence of pneumoperitoneum                                   ** Images reviewed. Agree with finding of acute appendicitis.    Assessment and Plan  Acute appendicitis.  -- Antibiotics given.  -- To OR this morning for laparoscopic appendectomy.    Lynn Gonzalez MD

## 2025-05-01 NOTE — ED ATTESTATION NOTE
I have personally seen and examined Aidee Damico.  I was involved in the care and medical decision making for this patient.    I reviewed and agree with physician assistant / nurse practitioner’s assessment and plan of care; any exceptions are documented below.      My focused history, examination, assessment and plan of care of Aidee Damico is as follows:    Brief History:  HPI  49-year-old female presents with right sided abdominal pain that started this morning.  Patient developed nausea and vomiting in the waiting room.      Focused Physical Exam:  Physical Exam  Patient is awake alert and appears mildly uncomfortable.  She has tenderness in her right mid and lower quadrant.  She has no left lower quadrant tenderness.      Assessment / Plan:  Lab work was obtained.  Her white blood cell count is elevated at 14.  Her blood work is otherwise normal.  CT imaging was obtained which showed significant acute appendicitis without evidence of perforation.  Patient was started on IV antibiotics.  General surgery was consulted and admitted the patient to the hospital.  Patient and her  were informed of the test results.      Medical Decision Making  Amount and/or Complexity of Data Reviewed  Labs: ordered. Decision-making details documented in ED Course.  Radiology: ordered.  ECG/medicine tests: ordered.    Risk  Prescription drug management.  Decision regarding hospitalization.        I was physically present for the key/critical portions of the following procedures:  Procedures         Latasha Gil MD  04/30/25 6823

## 2025-05-01 NOTE — OR SURGEON
Pre-Procedure patient identification:  I am the primary operating surgeon/proceduralist and I have reviewed the applicable pathology reports and radiology studies for this procedure. I have identified the patient on 05/01/25 at 9:10 AM Lynn Gonzalez MD  Phone Number: [phone]

## 2025-05-01 NOTE — OP NOTE
Procedure: APPENDECTOMY LAPAROSCOPIC      Date of Surgery: 5/1/2025    Indications:   The patient is a 49 year old woman who presented to the ED with significant  persistence right lower quadrant abdominal pain, and CT finding showing acute appendicitis.  She is brought to the operating room at this time for planned laparoscopic appendectomy.    Pre-Op Diagnosis:  Pre-op Diagnosis      * Acute appendicitis, unspecified acute appendicitis type [K35.80]    Post-Op Diagnosis:  Post-op Diagnosis     * Acute appendicitis, unspecified acute appendicitis type [K35.80]    Surgeon: Lynn Gonzalez MD    Assistant: GABRIELA Romo    Anesthesia: General endotracheal anesthesia and Local anesthesia 0.5% bupivacaine    Estimated Blood Loss:  5 mL    Urine Output: No Worthy           Specimens:   ID Type Source Tests Collected by Time Destination   1 : appendix Tissue Appendix PATHOLOGY TISSUE EXAM Lynn Gonzalez MD 5/1/2025 1029        Findings: Inflamed appendix with some cloudy ascites adjacent, but no nena evidence of perforation, gangrene, or suppuration. Piece of tissue adherent to, and removed with, appendix, which appeared to be either lymph node diverticulum from adjacent sigmoid colon.           Drains/Packing: None    Implants: * No implants in log *    Complications: None immediate.           Condition: Stable    Disposition: PACU - hemodynamically stable.    Procedure In Detail  At the time of the procedure, the patient was brought to the operating room and correctly identified by me, the anesthesia staff, and operating room team.  She was placed on operating table in supine position.  She was given general anesthesia and intubated without incident.  Her abdomen was cleared of hair using electric clippers.  It was then prepped and draped in usual sterile fashion.  The infraumbilical fold was infiltrated with local anesthetic.  It was grasped 2 penetrating towel clips and elevated.  An incision was made using 11  blade scalpel. S retractors x 2 were used to dissect down through the subcutaneous tissues to the level of the fascia. The fascia was grasped, elevated, and entered, followed by the peritoneum. 0 Vicryl stay sutures were placed and used to secure a Shirley trocar in position.  Pneumoperitoneum was then established to a pressure of 15 mmHg maintained throughout the case.  30° 5 mm laparoscope was advanced into the peritoneal cavity and inspection showed no injury from initial trocar placement.  The patient was placed in Trendelenburg position.  Immediate findings included no overt pathology. Secondary 5 mm trochars were placed under laparoscopic visualization after anesthetizing the abdominal wall in the left lower quadrant and the suprapubic midline.   In the right lower quadrant, further inspection showed some fibrinous slough adhesions around the appendix in the right lower quadrant/right pelvis.  There was small amount of murky or cloudy ascites adjacent to the appendix.  Acute adhesions were divided with blunt dissection.  Tissues, including appendix and mesoappendix were indurated and inflamed.  Dissection was carried out using electrocautery on hook, freeing the appendix and mesoappendix from the retroperitoneum.  The appendix was grasped and elevated and the base of the appendix was circumferentially dissected and divided using a linear cutting stapler.  The mesoappendix was then further mobilized and dissected out and then transected using vascular load on the same linear cutting stapler.  The appendix was placed within a specimen retrieval bag and extracted out through the umbilical trocar site with slight dilation of the site necessary to accommodate specimen removal.  The right lower quadrant was then further inspected.  Hemostasis was assured with application of electrocautery.  On inspection, there was found to be a nodular piece of tissue with staple line across it, which appeared to have been within  the mesoappendiceal staple line.  It had an atypical appearance for mesentery or fatty tissue and was initially suspected to possibly be an amputated remnant of the appendix.  It was therefore dissected out from adjacent fatty tissue and then amputated using reload on stapler.  The right lower quadrant was then further irrigated with saline solution.  Brief additional electrocautery was applied to achieve complete hemostasis.  The secondary trocars were removed under laparoscopic visualization.  Laparoscopic sites were noted to be hemostatic.  The last remaining trocar was used to vent as much pneumoperitoneum as possible and was then removed.  The umbilical fascia was closed using 0 Vicryl suture in figure-of-eight fashion x 3.  Deep dermis was approximated with interrupted 3-0 Vicryl suture.  Incisions were closed with 4-0 Monocryl subcuticular stitch.  Dry sterile dressings were applied. I then examined the specimen on the back table back table and found that the appendix had been removed in entirety with the first specimen.  Second piece of tissue that had been removed was noted to be somewhat firm and rounded, and thought possibly to represent a lymph node.  However sigmoid colon was also adjacent to the appendix site and possibility of resected diverticulum was considered also possible.    The patient was awakened from anesthesia and extubated. She was transferred to the recovery room in stable condition.  All needle, sponge, and instrument counts were correct ×2 at the end of the procedure.        Lynn Gonzalez MD  5/1/2025  12:13 PM

## 2025-05-02 VITALS
HEART RATE: 75 BPM | SYSTOLIC BLOOD PRESSURE: 125 MMHG | OXYGEN SATURATION: 97 % | DIASTOLIC BLOOD PRESSURE: 65 MMHG | TEMPERATURE: 98.5 F | WEIGHT: 145.5 LBS | RESPIRATION RATE: 18 BRPM | BODY MASS INDEX: 25.78 KG/M2 | HEIGHT: 63 IN

## 2025-05-02 PROBLEM — R10.9 ABDOMINAL PAIN, UNSPECIFIED ABDOMINAL LOCATION: Status: RESOLVED | Noted: 2025-04-30 | Resolved: 2025-05-02

## 2025-05-02 LAB
CASE RPRT: NORMAL
CLINICAL INFO: NORMAL
PATH REPORT.FINAL DX SPEC: NORMAL
PATH REPORT.GROSS SPEC: NORMAL

## 2025-05-02 PROCEDURE — 63600000 HC DRUGS/DETAIL CODE: Mod: JZ | Performed by: SURGERY

## 2025-05-02 PROCEDURE — G0378 HOSPITAL OBSERVATION PER HR: HCPCS

## 2025-05-02 PROCEDURE — 63700000 HC SELF-ADMINISTRABLE DRUG

## 2025-05-02 RX ORDER — OXYCODONE AND ACETAMINOPHEN 5; 325 MG/1; MG/1
1 TABLET ORAL EVERY 6 HOURS PRN
Status: DISCONTINUED | OUTPATIENT
Start: 2025-05-02 | End: 2025-05-02 | Stop reason: HOSPADM

## 2025-05-02 RX ORDER — OXYCODONE AND ACETAMINOPHEN 5; 325 MG/1; MG/1
1 TABLET ORAL EVERY 4 HOURS PRN
Qty: 12 TABLET | Refills: 0 | Status: SHIPPED | OUTPATIENT
Start: 2025-05-02 | End: 2025-05-07

## 2025-05-02 RX ADMIN — OXYCODONE HYDROCHLORIDE AND ACETAMINOPHEN 1 TABLET: 5; 325 TABLET ORAL at 08:48

## 2025-05-02 RX ADMIN — OXYCODONE HYDROCHLORIDE AND ACETAMINOPHEN 1 TABLET: 5; 325 TABLET ORAL at 14:55

## 2025-05-02 RX ADMIN — METRONIDAZOLE 500 MG: 500 INJECTION, SOLUTION INTRAVENOUS at 08:43

## 2025-05-02 RX ADMIN — HYDROMORPHONE HYDROCHLORIDE 0.2 MG: 0.2 INJECTION, SOLUTION INTRAMUSCULAR; INTRAVENOUS; SUBCUTANEOUS at 04:10

## 2025-05-02 ASSESSMENT — COGNITIVE AND FUNCTIONAL STATUS - GENERAL
MOVING TO AND FROM BED TO CHAIR: 4 - NONE
STANDING UP FROM CHAIR USING ARMS: 4 - NONE
WALKING IN HOSPITAL ROOM: 4 - NONE
CLIMB 3 TO 5 STEPS WITH RAILING: 3 - A LITTLE

## 2025-05-02 NOTE — NURSING NOTE
Discharge instructions reviewed with the virtual nurse.  All questions/concerns were addressed.  Bedside RN is aware.

## 2025-05-02 NOTE — PLAN OF CARE
Problem: Adult Inpatient Plan of Care  Goal: Plan of Care Review  Outcome: Progressing  Flowsheets (Taken 5/2/2025 3833)  Progress: improving  Outcome Evaluation: Pt is AAOx4. Abdominal pain managed with pain meds per order. IV ABX given per order, pt tolerated well, no signs, no adverse reactions noted. Pt OOB to the toilet with minimal assist. Voiding without ay issues.  No acute distress, call bell within reach.  Plan of Care Reviewed With: patient

## 2025-05-02 NOTE — PLAN OF CARE
Care Coordination Admission Assessment Note    General Information:  Readmission Within the last 30 days: no previous admission in last 30 days  Does patient have a :    Patient-Specific Goals (include timeframe): admit postop    Living Arrangements:  Arrived From: home  Current Living Arrangements: home  People in Home: spouse, child(brooke), dependent  Home Accessibility:    Living Arrangement Comments: Patient lives wit her spouse & children in a 2 story home with 2 steps to enter.    Housing Stability and Utility Access (SDOH):  In the last 12 months, was there a time when you were not able to pay the mortgage or rent on time?: No  In the past 12 months, how many times have you moved?:    At any time in the past 12 months, were you homeless or living in a shelter (including now)?: No  In the past 12 months has the electric, gas, oil, or water company threatened to shut off services in your home?: No    Functional Status Prior to Admission:   Assistive Device/Animal Currently Used at Home: none  Functional Status Comments: Patient is independent.  IADL Comments: Patient is independent     Supports and Services:  Current Outpatient/Agency/Support Group: none  Type of Current Home Care Services: none  History of home care episode or rehab stay: Hx of HC.    Discharge Needs Assessment:   Concerns to be Addressed: care coordination/care conferences, discharge planning  Current Discharge Risk: none  Anticipated Changes Related to Illness: none    Patient/Family Anticipated Discharge Plan:  Patient/Family Anticipates Transition To: home with family  Patient/Family Anticipated Services at Transition: none    Connection to Community       Patient Choice:   Offered/Gave Vendor List:         Anticipated Discharge Plan:  Met with patient. Provided education and contact information for Care Coordination services.: yes  Anticipated Discharge Disposition: home with assistance     Transportation Needs  (SDOH):  Transportation Concerns: none  Transportation Anticipated: family or friend will provide  Is Out of Hospital DNR needed at discharge?: no    In the past 12 months, has lack of transportation kept you from medical appointments or from getting medications?: No  In the past 12 months, has lack of transportation kept you from meetings, work, or from getting things needed for daily living?: No    Concerns - comments: Patient lives wit her spouse & children in a 2 story home with 2 steps to enter.  Patient's PCP, Pharmacy, & EC were confirmed.  Patient is independent.  Patient's spouse to provide transport.

## 2025-05-02 NOTE — NURSING NOTE
Tolerating regular diet, patient reports pain controlled well with percocet, ambulating hallway, using IS.

## 2025-05-02 NOTE — DISCHARGE INSTRUCTIONS
Laparoscopic Appendectomy, Adult, Care After    This sheet gives you information about how to care for yourself after your procedure. Your health care provider may also give you more specific instructions. If you have problems or questions, contact your health care provider.    What can I expect after the procedure?  After the procedure, it is common to have:  Little energy for normal activities.  Mild pain in the area where the incisions were made.  Difficulty passing stool (constipation). This can be caused by:  Pain medicine.  A decrease in your activity.  It is normal to be constipated for 2-3 days after abdominal surgery.    Follow these instructions at home:  Medicines  Take over-the-counter and prescription medicines only as told by your health care provider.  If you were prescribed an antibiotic medicine, take it as told by your health care provider. Do not stop taking the antibiotic even if you start to feel better.  Do not drive or use heavy machinery while taking prescription pain medicine.  Ask your health care provider if the medicine prescribed to you can cause constipation. You may need to take steps to prevent or treat constipation, such as:  Drink enough fluid to keep your urine pale yellow.  Take over-the-counter or prescription medicines.  Eat foods that are high in fiber, such as beans, whole grains, and fresh fruits and vegetables.  Limit foods that are high in fat and processed sugars, such as fried or sweet foods.    Incision care      Follow instructions from your health care provider about how to take care of your incisions. Make sure you:  Wash your hands with soap and water before and after you change your bandage (dressing). If soap and water are not available, use hand .  Change your dressing as told by your health care provider.  Leave adhesive strips in place for at least 10 days. If adhesive strip edges start to loosen and curl up, you may trim the loose edges. If they fall  off on their own, that is okay.   Check your incision areas every day for signs of infection. Check for:  Redness, swelling, or pain.  Fluid or blood.  Warmth.  Pus or a bad smell.    Bathing  Keep your incisions clean and dry. Clean them as often as told by your health care provider. To do this:  Gently wash the incisions with soap and water.  Rinse the incisions with water to remove all soap.  Pat the incisions dry with a clean towel. Do not rub the incisions.  Do not take baths, swim, or use a hot tub for 2 weeks, or until your health care provider approves. You may take showers starting 2 days after surgery, after gauze dressings are removed.     Activity    Do not drive for 24 hours after your procedure.  Rest after the procedure. Return to your normal activities as told by your health care provider. Ask your health care provider what activities are safe for you.  For 2 weeks, or for as long as told by your health care provider:  Do not lift anything that is heavier than 10 lb (4.5 kg), or the limit that you are told.  Do not play contact sports.    General instructions  If you were sent home with a drain, follow instructions from your health care provider about how to care for it.  Take deep breaths. This helps to prevent your lungs from developing an infection (pneumonia).  Keep all follow-up visits as told by your health care provider. This is important.    Contact a health care provider if:  You have redness, swelling, or pain around an incision.  You have fluid or blood coming from an incision.  Your incision feels warm to the touch.  You have pus or a bad smell coming from an incision or dressing.  Your incision edges break open after your sutures have been removed.  You have increasing pain in your shoulders.  You feel dizzy or you faint.  You develop shortness of breath.  You keep feeling nauseous or you are vomiting.  You have diarrhea or you cannot control your bowel functions.  You lose your  appetite.  You develop swelling or pain in your legs.  You develop a rash.    Get help right away if you have:  A fever.  Difficulty breathing.  Sharp pains in your chest.    Summary  After a laparoscopic appendectomy, it is common to have little energy for normal activities, mild pain in the area of the incisions, and constipation.  Infection is the most common complication after this procedure. Follow your health care provider's instructions about caring for yourself after the procedure.  Rest after the procedure. Return to your normal activities as told by your health care provider.  Contact your health care provider if you notice signs of infection around your incisions or you develop shortness of breath. Get help right away if you have a fever, chest pain, or difficulty breathing.    Follow-Up:  Call the office of your surgeon to schedule a follow-up appointment for about 2 weeks from your surgery:  Lynn Gonzalez MD, [facility]  General Surgery  [phone]  [address] [facility], [place] [address]  [place] [id number]    This information is not intended to replace advice given to you by your health care provider. Make sure you discuss any questions you have with your health care provider.  Document Revised: 06/20/2019 Document Reviewed: 06/20/2019  [employer] Patient Education © 2021 [employer]

## 2025-05-02 NOTE — PLAN OF CARE
Care Coordination Discharge Plan Note     Discharge Needs Assessment  Concerns to be Addressed: no discharge needs identified  Current Discharge Risk: none    Anticipated Discharge Plan  Anticipated Discharge Disposition: home with assistance       Patient Choice  Offered/Gave Vendor List:         ---------------------------------------------------------------------------------------------------------------------    Interdisciplinary Discharge Plan Review:  Participants:     Concerns Comments: Per medical team patient is stable to discharge home with assistance.  No discharge needs indicated.  Patient's spouse to provide transport.    Discharge Plan:   Disposition/Destination: Home  / Home  Discharge Facility:    Community Resources:      Discharge Transportation:  Is Out of Hospital DNR needed at Discharge: no  Does patient need discharge transport? No

## 2025-05-05 NOTE — [facility] PHYSICIAN REVIEW NOTE
Patient Name: Aidee Damico      MRN: [id number]    A phone call will not be placed to payor to request Peer to Peer phone appeal.    4/30-5/2    50 yo F admitted for RLQ abdominal pain diagnosed with acute appendicitis and underwent laparoscopic appendectomy . Patient discharged on POD #1.     Elissa Ahmadi DO  5/5/2025

## 2025-05-07 ENCOUNTER — TELEPHONE (OUTPATIENT)
Dept: SURGERY | Facility: CLINIC | Age: 50
End: 2025-05-07
Payer: COMMERCIAL

## 2025-05-15 ENCOUNTER — OFFICE VISIT (OUTPATIENT)
Dept: SURGERY | Facility: CLINIC | Age: 50
End: 2025-05-15
Payer: COMMERCIAL

## 2025-05-15 VITALS
OXYGEN SATURATION: 97 % | BODY MASS INDEX: 24.75 KG/M2 | SYSTOLIC BLOOD PRESSURE: 118 MMHG | WEIGHT: 145 LBS | RESPIRATION RATE: 16 BRPM | DIASTOLIC BLOOD PRESSURE: 78 MMHG | HEIGHT: 64 IN | HEART RATE: 64 BPM

## 2025-05-15 DIAGNOSIS — Z90.49 STATUS POST LAPAROSCOPIC APPENDECTOMY: Primary | ICD-10-CM

## 2025-05-15 PROCEDURE — 99024 POSTOP FOLLOW-UP VISIT: CPT | Performed by: SURGERY

## 2025-06-03 NOTE — DISCHARGE SUMMARY
Inpatient Discharge Summary    BRIEF OVERVIEW  Admitting Provider: Lynn Gonzalez MD  Discharge Provider: Lynn Gonzalez MD  Primary Care Physician at Discharge: Naz David  [phone]     Admission Date: 4/30/2025     Discharge Date: 5/2/25    Primary Discharge Diagnosis  Acute appendicitis    Secondary Discharge Diagnosis  N/a    Discharge Disposition  Home   Code Status at Discharge: Prior    Discharge Medications     Medication List        CONTINUE taking these medications      albuterol HFA 90 mcg/actuation inhaler  Inhale 2 puffs every 6 (six) hours as needed for wheezing or shortness of breath.  Dose: 2 puff     sertraline 50 mg tablet  Commonly known as: ZOLOFT  Take 75 mg by mouth nightly.  Dose: 75 mg     traZODone 50 mg tablet  Commonly known as: DESYREL  Take 50 mg by mouth nightly.  Dose: 50 mg     WEGOVY SUBQ  Inject 1.25 mg under the skin once a week on Thursday.  Dose: 1.25 mg            ASK your doctor about these medications      oxyCODONE-acetaminophen 5-325 mg per tablet  Commonly known as: PERCOCET  Take 1 tablet by mouth every 4 (four) hours as needed for moderate pain or severe pain for up to 5 days.  Dose: 1 tablet  Ask about: Should I take this medication?              Active Issues Requiring Follow-up  Routine postop follow up    Outpatient Follow-Up  Encounter Information    This patient does not currently have any appointments scheduled.     Follow-Up:  Call the office of your surgeon to schedule a follow-up appointment for about 2 weeks from your surgery:  Lynn Gonzalez MD, [facility]  General Surgery  [phone]  [address], [facility], [place] [address]  [place] [address]          Test Results Pending at Discharge  Unresulted Labs (From admission, onward)       Start     Ordered    04/30/25 1746  [facility] Draw Panel  STAT        Question Answer Comment   Red Top No Labels    Gold Top No Labels    Light Blue 1 Label    Light Green Top No Labels    Lavender Top No Labels    Pink Top No  Labels    Yellow - Urine Tall No Labels    Urine Culture Tube No Labels    Release to patient Immediate        25 [id number]                    DETAILS OF HOSPITAL STAY    Presenting Problem/History of Present Illness  Acute appendicitis, unspecified acute appendicitis type [K35.80]  Abdominal pain, unspecified abdominal location [R10.9]  Aidee Damico is a 49 y.o. female with a past medical history including asthma and surgical history of , who developed new onset lower abdominal pain associated with chills and vomiting starting yesterday.  In the emergency department, CT scan showed acute appendicitis.       Operative Procedures Performed  Procedure(s):  APPENDECTOMY LAPAROSCOPIC      Hospital Course  The patient was taken to the OR on 25. Intraoperative findings included inflamed appendix. Surgery was uncomplicated, as was her postoperative course and she was discharged to home on poD 1.     Discharge Disposition  Disposition: Home   Destination: Home

## (undated) DEVICE — EVACUATOR PLUME-AWAY LAP SMOKE PKG

## (undated) DEVICE — TIP BOVIE BLADE COATED INSULATED 2.75IN

## (undated) DEVICE — ENDOCATCH 10MM 173050G

## (undated) DEVICE — PACK RFID LAP CHOLE PMH

## (undated) DEVICE — RELOAD ENDO GIA45 ARTICULATING THICK MEDIUM

## (undated) DEVICE — SUTURE POLYSORB 0 VIOLET 1X30 GU-46

## (undated) DEVICE — SUTURE VICRYL 3-0 J416H SH UNDYED

## (undated) DEVICE — TROCAR XCEL BLADELESS 5MM X 100MM LONG

## (undated) DEVICE — ENDO GIA 45 MM ARTICULATING VASCULAR/MEDIUM LOADING UNIT

## (undated) DEVICE — TOWEL SURGICAL W17XL27IN BLUE COTTON STANDARD PREWASHED DELI

## (undated) DEVICE — MANIFOLD FOUR PORT NEPTUNE

## (undated) DEVICE — TIPS SCISSOR MICROLINE LAP

## (undated) DEVICE — GAUZE XEROFORM 1X8 STERILE/OVERWRAPPED PACKET

## (undated) DEVICE — DRAPE LAPAROTOMY POUCH ST 12/CS

## (undated) DEVICE — SOLN IRRIG .9%SOD 1000ML

## (undated) DEVICE — PAD GROUND ELECTROSURGICAL W/CORD

## (undated) DEVICE — DRESSING COVADERM 2IN X 2IN

## (undated) DEVICE — SLEEVE XCEL 5MM X 100MM LONG

## (undated) DEVICE — TROCAR BLUNT TIP 12 X 100MM

## (undated) DEVICE — MANIFOLD SINGLE PORT NEPTUNE

## (undated) DEVICE — MASTISOL LIQUID ADHESIVE

## (undated) DEVICE — CORD LAPAROSCOPIC DISP STERILE

## (undated) DEVICE — STAPLER ENDO GIA UNIVERSA ULTRA 12MM

## (undated) DEVICE — GRASPER BABCOCK ENDO 5MM

## (undated) DEVICE — TUBING SMOKE EVAC PENCIL COATED

## (undated) DEVICE — ELECTRODE EZ 45CM L-HOOK

## (undated) DEVICE — SUTURE MONOCRYL 4-0  Y496G PS-2 I8IN

## (undated) DEVICE — TUBING INSUFFLATION PNEUMOSURE HIGH FLOW

## (undated) DEVICE — SLEEVE SCD COMFORT KNEE LENGTH MED